# Patient Record
Sex: FEMALE | Race: WHITE | NOT HISPANIC OR LATINO | Employment: OTHER | URBAN - METROPOLITAN AREA
[De-identification: names, ages, dates, MRNs, and addresses within clinical notes are randomized per-mention and may not be internally consistent; named-entity substitution may affect disease eponyms.]

---

## 2017-01-27 ENCOUNTER — APPOINTMENT (EMERGENCY)
Dept: RADIOLOGY | Facility: HOSPITAL | Age: 82
End: 2017-01-27
Payer: COMMERCIAL

## 2017-01-27 ENCOUNTER — APPOINTMENT (EMERGENCY)
Dept: CT IMAGING | Facility: HOSPITAL | Age: 82
End: 2017-01-27
Payer: COMMERCIAL

## 2017-01-27 ENCOUNTER — HOSPITAL ENCOUNTER (EMERGENCY)
Facility: HOSPITAL | Age: 82
Discharge: HOME | End: 2017-01-27
Admitting: EMERGENCY MEDICINE
Payer: COMMERCIAL

## 2017-01-27 VITALS
RESPIRATION RATE: 20 BRPM | SYSTOLIC BLOOD PRESSURE: 166 MMHG | DIASTOLIC BLOOD PRESSURE: 71 MMHG | TEMPERATURE: 97.6 F | OXYGEN SATURATION: 97 % | WEIGHT: 141 LBS | BODY MASS INDEX: 25.95 KG/M2 | HEIGHT: 62 IN | HEART RATE: 80 BPM
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
## 2017-01-27 LAB
ALBUMIN SERPL BCP-MCNC: 3.6 G/DL (ref 3.5–5)
ALP SERPL-CCNC: 83 U/L (ref 46–116)
ALT SERPL W P-5'-P-CCNC: 19 U/L (ref 12–78)
ANION GAP SERPL CALCULATED.3IONS-SCNC: 9 MMOL/L (ref 4–13)
APTT PPP: 30 SECONDS (ref 24–36)
AST SERPL W P-5'-P-CCNC: 22 U/L (ref 5–45)
BACTERIA UR QL AUTO: ABNORMAL /HPF
BASOPHILS # BLD AUTO: 0.03 THOUSANDS/ΜL (ref 0–0.1)
BASOPHILS NFR BLD AUTO: 1 % (ref 0–1)
BILIRUB SERPL-MCNC: 0.4 MG/DL (ref 0.2–1)
BILIRUB UR QL STRIP: NEGATIVE
BUN SERPL-MCNC: 16 MG/DL (ref 5–25)
CALCIUM SERPL-MCNC: 9.4 MG/DL (ref 8.3–10.1)
CHLORIDE SERPL-SCNC: 93 MMOL/L (ref 100–108)
CLARITY UR: ABNORMAL
CO2 SERPL-SCNC: 26 MMOL/L (ref 21–32)
COLOR UR: YELLOW
CREAT SERPL-MCNC: 1.22 MG/DL (ref 0.6–1.3)
EOSINOPHIL # BLD AUTO: 0.05 THOUSAND/ΜL (ref 0–0.61)
EOSINOPHIL NFR BLD AUTO: 1 % (ref 0–6)
ERYTHROCYTE [DISTWIDTH] IN BLOOD BY AUTOMATED COUNT: 13.4 % (ref 11.6–15.1)
GFR SERPL CREATININE-BSD FRML MDRD: 41.8 ML/MIN/1.73SQ M
GLUCOSE SERPL-MCNC: 87 MG/DL (ref 65–140)
GLUCOSE UR STRIP-MCNC: NEGATIVE MG/DL
HCT VFR BLD AUTO: 37.6 % (ref 34.8–46.1)
HGB BLD-MCNC: 12.6 G/DL (ref 11.5–15.4)
HGB UR QL STRIP.AUTO: NEGATIVE
INR PPP: 0.97 (ref 0.86–1.16)
KETONES UR STRIP-MCNC: ABNORMAL MG/DL
LEUKOCYTE ESTERASE UR QL STRIP: ABNORMAL
LYMPHOCYTES # BLD AUTO: 1.77 THOUSANDS/ΜL (ref 0.6–4.47)
LYMPHOCYTES NFR BLD AUTO: 35 % (ref 14–44)
MCH RBC QN AUTO: 28.6 PG (ref 26.8–34.3)
MCHC RBC AUTO-ENTMCNC: 33.5 G/DL (ref 31.4–37.4)
MCV RBC AUTO: 85 FL (ref 82–98)
MONOCYTES # BLD AUTO: 0.4 THOUSAND/ΜL (ref 0.17–1.22)
MONOCYTES NFR BLD AUTO: 8 % (ref 4–12)
NEUTROPHILS # BLD AUTO: 2.81 THOUSANDS/ΜL (ref 1.85–7.62)
NEUTS SEG NFR BLD AUTO: 55 % (ref 43–75)
NITRITE UR QL STRIP: NEGATIVE
NON-SQ EPI CELLS URNS QL MICRO: ABNORMAL /HPF
PH UR STRIP.AUTO: 7 [PH] (ref 4.5–8)
PLATELET # BLD AUTO: 242 THOUSANDS/UL (ref 149–390)
PMV BLD AUTO: 9.2 FL (ref 8.9–12.7)
POTASSIUM SERPL-SCNC: 4.3 MMOL/L (ref 3.5–5.3)
PROT SERPL-MCNC: 6.6 G/DL (ref 6.4–8.2)
PROT UR STRIP-MCNC: NEGATIVE MG/DL
PROTHROMBIN TIME: 12.8 SECONDS (ref 12–14.3)
RBC # BLD AUTO: 4.41 MILLION/UL (ref 3.81–5.12)
RBC #/AREA URNS AUTO: ABNORMAL /HPF
SODIUM SERPL-SCNC: 128 MMOL/L (ref 136–145)
SP GR UR STRIP.AUTO: 1.01 (ref 1–1.03)
UROBILINOGEN UR QL STRIP.AUTO: 0.2 E.U./DL
WBC # BLD AUTO: 5.06 THOUSAND/UL (ref 4.31–10.16)
WBC #/AREA URNS AUTO: ABNORMAL /HPF

## 2017-01-27 PROCEDURE — 87086 URINE CULTURE/COLONY COUNT: CPT | Performed by: PHYSICIAN ASSISTANT

## 2017-01-27 PROCEDURE — 93005 ELECTROCARDIOGRAM TRACING: CPT

## 2017-01-27 PROCEDURE — 85610 PROTHROMBIN TIME: CPT | Performed by: PHYSICIAN ASSISTANT

## 2017-01-27 PROCEDURE — 99285 EMERGENCY DEPT VISIT HI MDM: CPT

## 2017-01-27 PROCEDURE — 73521 X-RAY EXAM HIPS BI 2 VIEWS: CPT

## 2017-01-27 PROCEDURE — 80053 COMPREHEN METABOLIC PANEL: CPT | Performed by: PHYSICIAN ASSISTANT

## 2017-01-27 PROCEDURE — 36415 COLL VENOUS BLD VENIPUNCTURE: CPT | Performed by: PHYSICIAN ASSISTANT

## 2017-01-27 PROCEDURE — 70450 CT HEAD/BRAIN W/O DYE: CPT

## 2017-01-27 PROCEDURE — 85025 COMPLETE CBC W/AUTO DIFF WBC: CPT | Performed by: PHYSICIAN ASSISTANT

## 2017-01-27 PROCEDURE — 81001 URINALYSIS AUTO W/SCOPE: CPT | Performed by: PHYSICIAN ASSISTANT

## 2017-01-27 PROCEDURE — 72125 CT NECK SPINE W/O DYE: CPT

## 2017-01-27 PROCEDURE — 71010 HB CHEST X-RAY 1 VIEW FRONTAL: CPT

## 2017-01-27 PROCEDURE — 85730 THROMBOPLASTIN TIME PARTIAL: CPT | Performed by: PHYSICIAN ASSISTANT

## 2017-01-27 RX ORDER — LOVASTATIN 20 MG/1
20 TABLET ORAL
Status: ON HOLD | COMMUNITY
End: 2018-04-16

## 2017-01-27 RX ORDER — HYDROCHLOROTHIAZIDE 25 MG/1
12.5 TABLET ORAL DAILY
COMMUNITY

## 2017-01-27 RX ORDER — LEVOTHYROXINE SODIUM 0.05 MG/1
50 TABLET ORAL DAILY
COMMUNITY
End: 2018-04-18 | Stop reason: HOSPADM

## 2017-01-27 RX ORDER — LISINOPRIL 20 MG/1
20 TABLET ORAL DAILY
COMMUNITY

## 2017-01-28 LAB — BACTERIA UR CULT: NORMAL

## 2017-01-30 LAB
ATRIAL RATE: 65 BPM
P AXIS: 36 DEGREES
PR INTERVAL: 194 MS
QRS AXIS: -39 DEGREES
QRSD INTERVAL: 112 MS
QT INTERVAL: 426 MS
QTC INTERVAL: 443 MS
T WAVE AXIS: 84 DEGREES
VENTRICULAR RATE: 65 BPM

## 2018-04-16 ENCOUNTER — APPOINTMENT (EMERGENCY)
Dept: RADIOLOGY | Facility: HOSPITAL | Age: 83
DRG: 065 | End: 2018-04-16
Payer: COMMERCIAL

## 2018-04-16 ENCOUNTER — HOSPITAL ENCOUNTER (INPATIENT)
Facility: HOSPITAL | Age: 83
LOS: 2 days | Discharge: HOME HEALTH | DRG: 065 | End: 2018-04-18
Attending: EMERGENCY MEDICINE | Admitting: HOSPITALIST
Payer: COMMERCIAL

## 2018-04-16 ENCOUNTER — APPOINTMENT (INPATIENT)
Dept: MRI IMAGING | Facility: HOSPITAL | Age: 83
DRG: 065 | End: 2018-04-16
Payer: COMMERCIAL

## 2018-04-16 ENCOUNTER — APPOINTMENT (EMERGENCY)
Dept: CT IMAGING | Facility: HOSPITAL | Age: 83
DRG: 065 | End: 2018-04-16
Payer: COMMERCIAL

## 2018-04-16 DIAGNOSIS — I63.9 ACUTE CVA (CEREBROVASCULAR ACCIDENT) (HCC): ICD-10-CM

## 2018-04-16 DIAGNOSIS — E03.9 ACQUIRED HYPOTHYROIDISM: ICD-10-CM

## 2018-04-16 DIAGNOSIS — I63.9 CVA (CEREBRAL VASCULAR ACCIDENT) (HCC): Primary | ICD-10-CM

## 2018-04-16 PROBLEM — E78.49 OTHER HYPERLIPIDEMIA: Status: ACTIVE | Noted: 2018-04-16

## 2018-04-16 PROBLEM — I10 ESSENTIAL HYPERTENSION: Status: ACTIVE | Noted: 2018-04-16

## 2018-04-16 LAB
ALBUMIN SERPL BCP-MCNC: 3.5 G/DL (ref 3.5–5)
ALP SERPL-CCNC: 79 U/L (ref 46–116)
ALT SERPL W P-5'-P-CCNC: 18 U/L (ref 12–78)
AMMONIA PLAS-SCNC: <10 UMOL/L (ref 11–35)
AMPHETAMINES SERPL QL SCN: NEGATIVE
ANION GAP SERPL CALCULATED.3IONS-SCNC: 10 MMOL/L (ref 4–13)
AST SERPL W P-5'-P-CCNC: 21 U/L (ref 5–45)
ATRIAL RATE: 58 BPM
BARBITURATES UR QL: NEGATIVE
BASOPHILS # BLD AUTO: 0.02 THOUSANDS/ΜL (ref 0–0.1)
BASOPHILS NFR BLD AUTO: 0 % (ref 0–1)
BENZODIAZ UR QL: NEGATIVE
BILIRUB SERPL-MCNC: 0.5 MG/DL (ref 0.2–1)
BILIRUB UR QL STRIP: NEGATIVE
BUN SERPL-MCNC: 13 MG/DL (ref 5–25)
CALCIUM SERPL-MCNC: 9.1 MG/DL
CHLORIDE SERPL-SCNC: 99 MMOL/L (ref 100–108)
CLARITY UR: CLEAR
CO2 SERPL-SCNC: 26 MMOL/L (ref 21–32)
COCAINE UR QL: NEGATIVE
COLOR UR: YELLOW
CREAT SERPL-MCNC: 0.9 MG/DL (ref 0.6–1.3)
EOSINOPHIL # BLD AUTO: 0.02 THOUSAND/ΜL (ref 0–0.61)
EOSINOPHIL NFR BLD AUTO: 0 % (ref 0–6)
ERYTHROCYTE [DISTWIDTH] IN BLOOD BY AUTOMATED COUNT: 13.8 % (ref 11.6–15.1)
ETHANOL SERPL-MCNC: <3 MG/DL (ref 0–3)
GFR SERPL CREATININE-BSD FRML MDRD: 58 ML/MIN/1.73SQ M
GLUCOSE SERPL-MCNC: 72 MG/DL (ref 65–140)
GLUCOSE UR STRIP-MCNC: NEGATIVE MG/DL
HCT VFR BLD AUTO: 37.7 % (ref 34.8–46.1)
HGB BLD-MCNC: 12.4 G/DL (ref 11.5–15.4)
HGB UR QL STRIP.AUTO: NEGATIVE
KETONES UR STRIP-MCNC: ABNORMAL MG/DL
LEUKOCYTE ESTERASE UR QL STRIP: NEGATIVE
LYMPHOCYTES # BLD AUTO: 1.28 THOUSANDS/ΜL (ref 0.6–4.47)
LYMPHOCYTES NFR BLD AUTO: 23 % (ref 14–44)
MCH RBC QN AUTO: 28.2 PG (ref 26.8–34.3)
MCHC RBC AUTO-ENTMCNC: 32.9 G/DL (ref 31.4–37.4)
MCV RBC AUTO: 86 FL (ref 82–98)
METHADONE UR QL: NEGATIVE
MONOCYTES # BLD AUTO: 0.37 THOUSAND/ΜL (ref 0.17–1.22)
MONOCYTES NFR BLD AUTO: 7 % (ref 4–12)
NEUTROPHILS # BLD AUTO: 4.01 THOUSANDS/ΜL (ref 1.85–7.62)
NEUTS SEG NFR BLD AUTO: 70 % (ref 43–75)
NITRITE UR QL STRIP: NEGATIVE
OPIATES UR QL SCN: NEGATIVE
P AXIS: 57 DEGREES
PCP UR QL: NEGATIVE
PH UR STRIP.AUTO: 7 [PH] (ref 4.5–8)
PLATELET # BLD AUTO: 250 THOUSANDS/UL (ref 149–390)
PMV BLD AUTO: 9.6 FL (ref 8.9–12.7)
POTASSIUM SERPL-SCNC: 3.6 MMOL/L (ref 3.5–5.3)
PR INTERVAL: 216 MS
PROT SERPL-MCNC: 6.7 G/DL (ref 6.4–8.2)
PROT UR STRIP-MCNC: NEGATIVE MG/DL
QRS AXIS: -29 DEGREES
QRSD INTERVAL: 112 MS
QT INTERVAL: 452 MS
QTC INTERVAL: 443 MS
RBC # BLD AUTO: 4.39 MILLION/UL (ref 3.81–5.12)
SODIUM SERPL-SCNC: 135 MMOL/L (ref 136–145)
SP GR UR STRIP.AUTO: 1.01 (ref 1–1.03)
T WAVE AXIS: 86 DEGREES
T4 FREE SERPL-MCNC: 0.9 NG/DL (ref 0.76–1.46)
THC UR QL: NEGATIVE
TROPONIN I SERPL-MCNC: <0.02 NG/ML
TSH SERPL DL<=0.05 MIU/L-ACNC: 5.7 UIU/ML (ref 0.36–3.74)
UROBILINOGEN UR QL STRIP.AUTO: 0.2 E.U./DL
VENTRICULAR RATE: 58 BPM
WBC # BLD AUTO: 5.7 THOUSAND/UL (ref 4.31–10.16)

## 2018-04-16 PROCEDURE — 93010 ELECTROCARDIOGRAM REPORT: CPT | Performed by: INTERNAL MEDICINE

## 2018-04-16 PROCEDURE — 84484 ASSAY OF TROPONIN QUANT: CPT | Performed by: EMERGENCY MEDICINE

## 2018-04-16 PROCEDURE — 70496 CT ANGIOGRAPHY HEAD: CPT

## 2018-04-16 PROCEDURE — 81003 URINALYSIS AUTO W/O SCOPE: CPT | Performed by: EMERGENCY MEDICINE

## 2018-04-16 PROCEDURE — 82140 ASSAY OF AMMONIA: CPT | Performed by: EMERGENCY MEDICINE

## 2018-04-16 PROCEDURE — 71046 X-RAY EXAM CHEST 2 VIEWS: CPT

## 2018-04-16 PROCEDURE — 99223 1ST HOSP IP/OBS HIGH 75: CPT | Performed by: HOSPITALIST

## 2018-04-16 PROCEDURE — G0480 DRUG TEST DEF 1-7 CLASSES: HCPCS | Performed by: EMERGENCY MEDICINE

## 2018-04-16 PROCEDURE — 84439 ASSAY OF FREE THYROXINE: CPT | Performed by: HOSPITALIST

## 2018-04-16 PROCEDURE — 80053 COMPREHEN METABOLIC PANEL: CPT | Performed by: EMERGENCY MEDICINE

## 2018-04-16 PROCEDURE — 80307 DRUG TEST PRSMV CHEM ANLYZR: CPT | Performed by: EMERGENCY MEDICINE

## 2018-04-16 PROCEDURE — 99285 EMERGENCY DEPT VISIT HI MDM: CPT

## 2018-04-16 PROCEDURE — 84443 ASSAY THYROID STIM HORMONE: CPT | Performed by: EMERGENCY MEDICINE

## 2018-04-16 PROCEDURE — 70450 CT HEAD/BRAIN W/O DYE: CPT

## 2018-04-16 PROCEDURE — 92610 EVALUATE SWALLOWING FUNCTION: CPT

## 2018-04-16 PROCEDURE — 93005 ELECTROCARDIOGRAM TRACING: CPT | Performed by: EMERGENCY MEDICINE

## 2018-04-16 PROCEDURE — 80320 DRUG SCREEN QUANTALCOHOLS: CPT | Performed by: EMERGENCY MEDICINE

## 2018-04-16 PROCEDURE — 70551 MRI BRAIN STEM W/O DYE: CPT

## 2018-04-16 PROCEDURE — 70498 CT ANGIOGRAPHY NECK: CPT

## 2018-04-16 PROCEDURE — 36415 COLL VENOUS BLD VENIPUNCTURE: CPT | Performed by: EMERGENCY MEDICINE

## 2018-04-16 PROCEDURE — 85025 COMPLETE CBC W/AUTO DIFF WBC: CPT | Performed by: EMERGENCY MEDICINE

## 2018-04-16 RX ORDER — LEVOTHYROXINE SODIUM 0.07 MG/1
75 TABLET ORAL DAILY
Status: DISCONTINUED | OUTPATIENT
Start: 2018-04-16 | End: 2018-04-18 | Stop reason: HOSPADM

## 2018-04-16 RX ORDER — ASPIRIN 325 MG
325 TABLET ORAL ONCE
Status: COMPLETED | OUTPATIENT
Start: 2018-04-16 | End: 2018-04-16

## 2018-04-16 RX ORDER — ATORVASTATIN CALCIUM 40 MG/1
40 TABLET, FILM COATED ORAL EVERY EVENING
Status: DISCONTINUED | OUTPATIENT
Start: 2018-04-16 | End: 2018-04-18 | Stop reason: HOSPADM

## 2018-04-16 RX ORDER — ASPIRIN 81 MG/1
81 TABLET, CHEWABLE ORAL DAILY
Status: DISCONTINUED | OUTPATIENT
Start: 2018-04-17 | End: 2018-04-18 | Stop reason: HOSPADM

## 2018-04-16 RX ORDER — HYDROCHLOROTHIAZIDE 25 MG/1
12.5 TABLET ORAL DAILY
Status: DISCONTINUED | OUTPATIENT
Start: 2018-04-16 | End: 2018-04-18 | Stop reason: HOSPADM

## 2018-04-16 RX ORDER — ACETAMINOPHEN 325 MG/1
650 TABLET ORAL EVERY 6 HOURS PRN
Status: DISCONTINUED | OUTPATIENT
Start: 2018-04-16 | End: 2018-04-18 | Stop reason: HOSPADM

## 2018-04-16 RX ORDER — LISINOPRIL 20 MG/1
20 TABLET ORAL DAILY
Status: DISCONTINUED | OUTPATIENT
Start: 2018-04-16 | End: 2018-04-18 | Stop reason: HOSPADM

## 2018-04-16 RX ADMIN — ASPIRIN 325 MG ORAL TABLET 325 MG: 325 PILL ORAL at 13:32

## 2018-04-16 RX ADMIN — ACETAMINOPHEN 650 MG: 325 TABLET, FILM COATED ORAL at 23:40

## 2018-04-16 RX ADMIN — ATORVASTATIN CALCIUM 40 MG: 40 TABLET, FILM COATED ORAL at 18:33

## 2018-04-16 RX ADMIN — IOHEXOL 100 ML: 350 INJECTION, SOLUTION INTRAVENOUS at 12:44

## 2018-04-16 RX ADMIN — ENOXAPARIN SODIUM 40 MG: 40 INJECTION SUBCUTANEOUS at 15:36

## 2018-04-16 NOTE — ED PROVIDER NOTES
History  Chief Complaint   Patient presents with    Altered Mental Status     Pt coming from home  Daughter reports change in mental status/confusion and possible left side facial droop that seems to have resolved  Patient is an 70-year-old female  She has a history of hypertension and hypercholesterolemia  This morning she woke up confused  She was slower than normal  She was repetitive  She was having short-term memory problems  She kept asking if she had the take granddaughter to school  Her granddaughter was already at the school  Family felt that there might have been some left facial droop  That resolved  There is no trauma  There been no fevers  Otherwise she has been well  Last known well time was last night  Prior to Admission Medications   Prescriptions Last Dose Informant Patient Reported? Taking?   hydrochlorothiazide (HYDRODIURIL) 25 mg tablet   Yes Yes   Sig: Take 12 5 mg by mouth daily     levothyroxine 50 mcg tablet   Yes Yes   Sig: Take 50 mcg by mouth daily   lisinopril (ZESTRIL) 20 mg tablet   Yes Yes   Sig: Take 20 mg by mouth daily        Facility-Administered Medications: None       Past Medical History:   Diagnosis Date    Disease of thyroid gland     Hyperlipidemia     Hypertension        Past Surgical History:   Procedure Laterality Date    EYE SURGERY      b/l cataracts    ROTATOR CUFF REPAIR         History reviewed  No pertinent family history  I have reviewed and agree with the history as documented  Social History   Substance Use Topics    Smoking status: Never Smoker    Smokeless tobacco: Never Used    Alcohol use No        Review of Systems   Constitutional: Negative for chills and fever  HENT: Negative for rhinorrhea and sore throat  Eyes: Negative for pain, redness and visual disturbance  Respiratory: Negative for cough and shortness of breath  Cardiovascular: Negative for chest pain and leg swelling     Gastrointestinal: Negative for abdominal pain, diarrhea and vomiting  Endocrine: Negative for polydipsia and polyuria  Genitourinary: Negative for dysuria, frequency, hematuria, vaginal bleeding and vaginal discharge  Musculoskeletal: Negative for back pain and neck pain  Skin: Negative for rash and wound  Allergic/Immunologic: Negative for immunocompromised state  Neurological: Negative for weakness, numbness and headaches  Hematological: Does not bruise/bleed easily  Psychiatric/Behavioral: Positive for confusion  Negative for hallucinations and suicidal ideas  All other systems reviewed and are negative  Physical Exam  ED Triage Vitals [04/16/18 1117]   Temperature Pulse Respirations Blood Pressure SpO2   (!) 97 2 °F (36 2 °C) 62 18 (!) 174/78 100 %      Temp Source Heart Rate Source Patient Position - Orthostatic VS BP Location FiO2 (%)   Tympanic Monitor Lying Left arm --      Pain Score       No Pain           Orthostatic Vital Signs  Vitals:    04/16/18 1245 04/16/18 1300 04/16/18 1315 04/16/18 1431   BP: 164/70 (!) 173/75 153/75 (!) 172/77   Pulse: 66 63 61 62   Patient Position - Orthostatic VS:    Lying       Physical Exam   Constitutional: She is oriented to person, place, and time  She appears well-developed and well-nourished  No distress  HENT:   Head: Normocephalic and atraumatic  Mouth/Throat: Oropharynx is clear and moist    Eyes: Conjunctivae and EOM are normal  Pupils are equal, round, and reactive to light  Right eye exhibits no discharge  Left eye exhibits no discharge  No scleral icterus  Neck: Normal range of motion  Neck supple  Cardiovascular: Normal rate, regular rhythm, normal heart sounds and intact distal pulses  Exam reveals no gallop and no friction rub  No murmur heard  Pulmonary/Chest: Effort normal and breath sounds normal  No stridor  No respiratory distress  She has no wheezes  She has no rales  Abdominal: Soft  Bowel sounds are normal  She exhibits no distension   There is no tenderness  There is no rebound and no guarding  Musculoskeletal: Normal range of motion  She exhibits no edema, tenderness or deformity  No CVA tenderness  No calf tenderness/palpable cords  Neurological: She is alert and oriented to person, place, and time  She has normal strength  No sensory deficit  GCS eye subscore is 4  GCS verbal subscore is 5  GCS motor subscore is 6  Patient is slightly confused and repetitive  Skin: Skin is warm and dry  No rash noted  She is not diaphoretic  Psychiatric: She has a normal mood and affect  Vitals reviewed  ED Medications  Medications   hydrochlorothiazide (HYDRODIURIL) tablet 12 5 mg (not administered)   lisinopril (ZESTRIL) tablet 20 mg (not administered)   levothyroxine tablet 75 mcg (not administered)   atorvastatin (LIPITOR) tablet 40 mg (not administered)   aspirin chewable tablet 81 mg (not administered)   enoxaparin (LOVENOX) subcutaneous injection 40 mg (not administered)   iohexol (OMNIPAQUE) 350 MG/ML injection (MULTI-DOSE) 100 mL (100 mL Intravenous Given 4/16/18 1244)   aspirin tablet 325 mg (325 mg Oral Given 4/16/18 1332)       Diagnostic Studies  Results Reviewed     Procedure Component Value Units Date/Time    Rapid drug screen, urine [[id number]]  (Normal) Collected:  04/16/18 1343    Lab Status:  Final result Specimen:  Urine from Urine, Clean Catch Updated:  04/16/18 1435     Amph/Meth UR Negative     Barbiturate Ur Negative     Benzodiazepine Urine Negative     Cocaine Urine Negative     Methadone Urine Negative     Opiate Urine Negative     PCP Ur Negative     THC Urine Negative    Narrative:         FOR MEDICAL PURPOSES ONLY  IF CONFIRMATION NEEDED PLEASE CONTACT THE LAB WITHIN 5 DAYS      Drug Screen Cutoff Levels:  AMPHETAMINE/METHAMPHETAMINES  1000 ng/mL  BARBITURATES     200 ng/mL  BENZODIAZEPINES     200 ng/mL  COCAINE      300 ng/mL  METHADONE      300 ng/mL  OPIATES      300 ng/mL  PHENCYCLIDINE     25 ng/mL  THC       50 ng/mL    UA w Reflex to Microscopic [[id number]]  (Abnormal) Collected:  04/16/18 1343    Lab Status:  Final result Specimen:  Urine from Urine, Clean Catch Updated:  04/16/18 1428     Color, UA Yellow     Clarity, UA Clear     Specific Gravity, UA 1 010     pH, UA 7 0     Leukocytes, UA Negative     Nitrite, UA Negative     Protein, UA Negative mg/dl      Glucose, UA Negative mg/dl      Ketones, UA Trace (A) mg/dl      Urobilinogen, UA 0 2 E U /dl      Bilirubin, UA Negative     Blood, UA Negative    Ammonia [[id number]]  (Abnormal) Collected:  04/16/18 1137    Lab Status:  Final result Specimen:  Blood Updated:  04/16/18 1255     Ammonia <10 (L) umol/L     TSH [[id number]]  (Abnormal) Collected:  04/16/18 1145    Lab Status:  Final result Specimen:  Blood from Arm, Left Updated:  04/16/18 1239     TSH 3RD GENERATON 5 699 (H) uIU/mL     Narrative:         Patients undergoing fluorescein dye angiography may retain small amounts of fluorescein in the body for 48-72 hours post procedure  Samples containing fluorescein can produce falsely depressed TSH values  If the patient had this procedure,a specimen should be resubmitted post fluorescein clearance            The recommended reference ranges for TSH during pregnancy are as follows:  First trimester 0 1 to 2 5 uIU/mL  Second trimester  0 2 to 3 0 uIU/mL  Third trimester 0 3 to 3 0 uIU/m      Comprehensive metabolic panel [[id number]]  (Abnormal) Collected:  04/16/18 1145    Lab Status:  Final result Specimen:  Blood from Arm, Left Updated:  04/16/18 1221     Sodium 135 (L) mmol/L      Potassium 3 6 mmol/L      Chloride 99 (L) mmol/L      CO2 26 mmol/L      Anion Gap 10 mmol/L      BUN 13 mg/dL      Creatinine 0 90 mg/dL      Glucose 72 mg/dL      Calcium 9 1 mg/dL      AST 21 U/L      ALT 18 U/L      Alkaline Phosphatase 79 U/L      Total Protein 6 7 g/dL      Albumin 3 5 g/dL      Total Bilirubin 0 50 mg/dL      eGFR 58 ml/min/1 73sq m     Narrative:         National Kidney Disease Education Program recommendations are as follows:  GFR calculation is accurate only with a steady state creatinine  Chronic Kidney disease less than 60 ml/min/1 73 sq  meters  Kidney failure less than 15 ml/min/1 73 sq  meters  Ethanol [[id number]]  (Normal) Collected:  04/16/18 1145    Lab Status:  Final result Specimen:  Blood from Arm, Left Updated:  04/16/18 1218     Ethanol Lvl <3 0 mg/dL     Troponin I [[id number]]  (Normal) Collected:  04/16/18 1145    Lab Status:  Final result Specimen:  Blood from Arm, Left Updated:  04/16/18 1213     Troponin I <0 02 ng/mL     Narrative:         [employer] Chemistry analyzer 99% cutoff is > 0 04 ng/mL in network labs    o cTnI 99% cutoff is useful only when applied to patients in the clinical setting of myocardial ischemia  o cTnI 99% cutoff should be interpreted in the context of clinical history, ECG findings and possibly cardiac imaging to establish correct diagnosis  o cTnI 99% cutoff may be suggestive but clearly not indicative of a coronary event without the clinical setting of myocardial ischemia      CBC and differential [[id number]]  (Normal) Collected:  04/16/18 1145    Lab Status:  Final result Specimen:  Blood from Arm, Left Updated:  04/16/18 1155     WBC 5 70 Thousand/uL      RBC 4 39 Million/uL      Hemoglobin 12 4 g/dL      Hematocrit 37 7 %      MCV 86 fL      MCH 28 2 pg      MCHC 32 9 g/dL      RDW 13 8 %      MPV 9 6 fL      Platelets 307 Thousands/uL      Neutrophils Relative 70 %      Lymphocytes Relative 23 %      Monocytes Relative 7 %      Eosinophils Relative 0 %      Basophils Relative 0 %      Neutrophils Absolute 4 01 Thousands/µL      Lymphocytes Absolute 1 28 Thousands/µL      Monocytes Absolute 0 37 Thousand/µL      Eosinophils Absolute 0 02 Thousand/µL      Basophils Absolute 0 02 Thousands/µL                  CTA head and neck with and without contrast   Final Result by Isatu Viveros MD (04/16 1936)   Calcified thrombus at the level of the origin of the right superior frontal branch of the right middle cerebral artery, associated with several cm anterior right frontal acute infarction, similar to recent CT  No evidence of critical stenosis involving the cervical carotid or vertebral segments, although 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, 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      Approximately 2 6 cm right neck mass likely representing adenopathy, uncertain source      Moderate chronic microangiopathic ischemic changes involving supratentorial white matter         Findings personally discussed by phone with Dr Meg Green at 1:15 PM, 4/16/2018               Workstation performed: [id number]         XR chest 2 views   ED Interpretation by Rafael Irwin MD (04/16 1302)   Cardiomegaly  No infiltrates  No congestive heart failure  Final Result by Rina Fuchs MD (04/16 1157)      No active pulmonary disease  Workstation performed: [id number]         CT head without contrast   Final Result by Rosa Elena Argueta MD (04/16 1207)   Addendum 1 of 1 by Rosa Elena Argueta MD (04/16 1212)   ADDENDUM:      8mm right sublenticular prominent perivascular space  Final      Acute right anterolateral frontal lobe infarction  No intraparenchymal hemorrhage  Potential punctate hyperdense thrombus right prefrontal branch of the superior M2 segment (image 20 series 2)  Chronic microangiopathy  I personally discussed this study with Lavelle Rivera on 4/16/2018 at 12:04 PM                            Workstation performed: [id number]         MRI Inpatient Order    (Results Pending)              Procedures  ECG 12 Lead Documentation  Date/Time: 4/16/2018 1:03 PM  Performed by: Fritz Pereyra by: Diogo Santana     ECG reviewed by me, the ED Provider: yes    Patient location:  ED  Comments:       Normal sinus rhythm  Nonspecific lateral ST abnormality  No ectopy             Phone Contacts  ED Phone Contact    ED Course  ED Course              NIH Stroke Scale    Flowsheet Row Most Recent Value   Level of Consciousness (1a )  0 Filed at: 04/16/2018 1206   LOC Questions (1b )  0 Filed at: 04/16/2018 1206   LOC Commands (1c )  0 Filed at: 04/16/2018 1206   Best Gaze (2 )  0 Filed at: 04/16/2018 1206   Visual (3 )  0 Filed at: 04/16/2018 1206   Facial Palsy (4 )  0 Filed at: 04/16/2018 1206   Motor Arm, Left (5a )  0 Filed at: 04/16/2018 1206   Motor Arm, Right (5b )  0 Filed at: 04/16/2018 1206   Motor Leg, Left (6a )  0 Filed at: 04/16/2018 1206   Motor Leg, Right (6b )  0 Filed at: 04/16/2018 1206   Limb Ataxia (7 )  0 Filed at: 04/16/2018 1206   Sensory (8 )  0 Filed at: 04/16/2018 1206   Best Language (9 )  0 Filed at: 04/16/2018 1206   Dysarthria (10 )  0 Filed at: 04/16/2018 1206   Extinction and Inattention (11 ) (Formerly Neglect)  0 Filed at: 04/16/2018 1206   Total  0 Filed at: 04/16/2018 1206                        MDM  Number of Diagnoses or Management Options  Diagnosis management comments:  Stroke alert was called  Patient did undergo CT of the head  It did show a relatively acute infarct of the right frontal lobe  She is not a tPA candidate because she is out of the tPA window  NIH stroke scale is actually 0  Consulted with Neurology  Patient is not a candidate for mechanical clot removal   Consulted with Medicine for admission  Amount and/or Complexity of Data Reviewed  Clinical lab tests: ordered and reviewed  Tests in the radiology section of CPT®: ordered and reviewed  Discuss the patient with other providers: yes  Independent visualization of images, tracings, or specimens: yes      The patient presented with a condition in which there was a high probability of imminent or life-threatening deterioration, and critical care services (excluding separately billable procedures) totalled 30-74 minutes (60 min excluding procedure time  )          Disposition  Final diagnoses:   Acute CVA (cerebrovascular accident) [facility])     Time reflects when diagnosis was documented in both MDM as applicable and the Disposition within this note     Time User Action Codes Description Comment    4/16/2018 12:42 PM Chetna Evans Add [I63 9] CVA (cerebral vascular accident) ([facility] [address] )     4/16/2018  1:01 PM Ana María Ugalde Add [I63 9] Acute CVA (cerebrovascular accident) [facility])       ED Disposition     ED Disposition Condition Comment    Admit        Follow-up Information    None       Current Discharge Medication List      CONTINUE these medications which have NOT CHANGED    Details   hydrochlorothiazide (HYDRODIURIL) 25 mg tablet Take 12 5 mg by mouth daily        levothyroxine 50 mcg tablet Take 50 mcg by mouth daily      lisinopril (ZESTRIL) 20 mg tablet Take 20 mg by mouth daily             No discharge procedures on file      ED Provider  Electronically Signed by           Blanquita Marie MD  04/16/18 [address], [place]  04/16/18 7939

## 2018-04-16 NOTE — ASSESSMENT & PLAN NOTE
-CT Brain: Acute right anterolateral frontal lobe infarction   No intraparenchymal hemorrhage   -aspirin and statin  -MRI of the brain  -echocardiogram  -consult Neurology  -monitor on telemetry  -PT/OT

## 2018-04-16 NOTE — SPEECH THERAPY NOTE
Speech Language/Pathology  Speech/Language Pathology  Assessment    Patient Name: Bharati Kenny  SHPGQ'A Date: 4/16/2018     Problem List  Patient Active Problem List   Diagnosis    [facility]) [facility])    Essential hypertension    Other hyperlipidemia    Acquired hypothyroidism     Past Medical History  Past Medical History:   Diagnosis Date    Disease of thyroid gland     Hyperlipidemia     Hypertension      Past Surgical History  Past Surgical History:   Procedure Laterality Date    EYE SURGERY      b/l cataracts    ROTATOR CUFF REPAIR     Summary:  Pt presents w/ a relatively functional swallow but w/ noted large bites & periodic dry cough  Pt does take large boluses & over fill the oral cavity but appears to tolerate it  Will f/u as able  Recommendations:  Diet: continue regular  Liquid: thin  Meds: as tolerated  Supervision:  Positioning:Upright  Strategies: Pt to take PO/Meds only when fully alert and upright  Oral care: frequently  Aspiration precautions    Therapy Prognosis: will f/u 1-2x as able, will call nsg   Prognosis considerations: age/cognition  Frequency:1-2x      80 y o  female who presents with a chief complaint confusion  The patient was in her usual state of health yesterday  The patient usually takes her granddaughter to school but today she was not scheduled to take her to school  She called her family and said that she had for gotten to take the granddaughter to school and was confused  She kept repeating the same thing over and over again  She may have had a left-sided facial droop which has resolved  She denies any other symptomatology  Her symptoms were noted at 10:00 and the last time she was seen in her usual state health was last night  The patient has no other complaints  She does remember the events from earlier and does know at this time that she was not supposed to take her granddaughter to school   Patient denies chest pain, shortness of breath, palpitations, cough, nausea, vomiting, diarrhea, abdominal pain, fevers, chills, night sweats, headache, visual disturbances, neck stiffness, rash, dysuria, heat or cold intolerance, significant weight gain or loss  MRI-Relatively large early subacute infarct within the right lateral frontal lobe corresponding to the abnormality seen on CT scan earlier today  Mild localized mass effect with no hemorrhagic transformation    Atrophy and chronic microangiopathic change scattered within the remainder of the brain parenchyma      CXR-No active pulmonary disease    Reason for consult:  R/o aspiration  Determine safest and least restrictive diet  Failed nursing dysphagia assessment  Change in mental status  New neuro event    Current diet:  Regular w/ thin  Premorbid diet[de-identified]  Regular w/ thin  Previous VBS:  -  O2 requirement:  RA  Voice/Speech:  Clear, intelligible  Social:  Home w/ her grd ddtr  Follows commands:      yes                    Cognitive Status:  Alert, cooperative, oriented to person, general time, reason for adm  Oral mech exam:  Upper/lower dentures  Full symmetry  Items administered:  Puree, soft solid, hard solid, thin liquids, meds whole/crushed in applesauce/water  Liquids were taken by straw  Oral stage:  Lip closure: wfl  Mastication: adequate  Bolus formation:wfl  Bolus control: wfl  Transfer: timely  Oral residue:none noted  Pt takes large boluses, c/o being hungry    Pharyngeal stage:  Swallow promptness: +  Laryngeal rise: wfl  Wet voice: none  Throat clear: none  Cough: intermittent through meal but well after swallows, dry??  Secondary swallows:mult swallows   Audible swallows:  No s/s aspiration:    Esophageal stage:  No s/s reported      Goal(s):  Pt will tolerate least restrictive diet w/out s/s aspiration or oral/pharyngeal difficulties

## 2018-04-16 NOTE — H&P
H&P- Al Man 12/16/1930, 80 y o  female MRN: [id number]    Unit/Bed#: [address] [place], [place] [id number] Encounter: [id number]    Primary Care Provider: Rivka Jennings   Date and time admitted to hospital: 4/16/2018 11:04 AM        Acquired hypothyroidism   Assessment & Plan    -TSH is elevated so will increase the patient's Levoxyl 75 mcg daily  -repeat thyroid function tests in 4-6 weeks  -check free T4 now        Other hyperlipidemia   Assessment & Plan    -change statin to Lipitor 40 mg daily        Essential hypertension   Assessment & Plan    -continue HCTZ and lisinopril        * Acute CVA (cerebrovascular accident) ([facility] [address] )   Assessment & Plan    -CT Brain: Acute right anterolateral frontal lobe infarction   No intraparenchymal hemorrhage   -aspirin and statin  -MRI of the brain  -echocardiogram  -consult Neurology  -monitor on telemetry  -PT/OT          VTE Prophylaxis: Enoxaparin (Lovenox)  / sequential compression device   Code Status: FULL  POLST: POLST is not applicable to this patient  Discussion with family: multiple family members at bedside    Anticipated Length of Stay:  Patient will be admitted on an Inpatient basis with an anticipated length of stay of  > 2 midnights  Justification for Hospital Stay: CVA    Total Time for Visit, including Counseling / Coordination of Care: 30 minutes  Greater than 50% of this total time spent on direct patient counseling and coordination of care  Chief Complaint:   confusion    History of Present Illness:    Jose Chapman is a 80 y o  female who presents with a chief complaint confusion  The patient was in her usual state of health yesterday  The patient usually takes her granddaughter to school but today she was not scheduled to take her to school  She called her family and said that she had for gotten to take the granddaughter to school and was confused  She kept repeating the same thing over and over again    She may have had a left-sided facial droop which has resolved  She denies any other symptomatology  Her symptoms were noted at 10:00 and the last time she was seen in her usual state health was last night  The patient has no other complaints  She does remember the events from earlier and does know at this time that she was not supposed to take her granddaughter to school  Patient denies chest pain, shortness of breath, palpitations, cough, nausea, vomiting, diarrhea, abdominal pain, fevers, chills, night sweats, headache, visual disturbances, neck stiffness, rash, dysuria, heat or cold intolerance, significant weight gain or loss  Review of Systems:    Review of Systems   All other systems reviewed and are negative  Past Medical and Surgical History:     Past Medical History:   Diagnosis Date    Disease of thyroid gland     Hyperlipidemia     Hypertension        Past Surgical History:   Procedure Laterality Date    EYE SURGERY      b/l cataracts    ROTATOR CUFF REPAIR         Meds/Allergies:    Prior to Admission medications    Medication Sig Start Date End Date Taking? Authorizing Provider   hydrochlorothiazide (HYDRODIURIL) 25 mg tablet Take 12 5 mg by mouth daily     Yes Historical Provider, MD   levothyroxine 50 mcg tablet Take 50 mcg by mouth daily   Yes Historical Provider, MD   lisinopril (ZESTRIL) 20 mg tablet Take 20 mg by mouth daily     Yes Historical Provider, MD   lovastatin (MEVACOR) 20 mg tablet Take 20 mg by mouth daily at bedtime  4/16/18 Yes Historical Provider, MD     I have reviewed home medications with patient personally      Allergies: No Known Allergies    Social History:     Marital Status: Single     Substance Use History:   History   Alcohol Use No     History   Smoking Status    Never Smoker   Smokeless Tobacco    Never Used     History   Drug Use No       Family History:    non-contributory    Physical Exam:     Vitals:   Blood Pressure: (!) 172/77 (04/16/18 1431)  Pulse: 62 (04/16/18 1431)  Temperature: 98 7 °F (37 1 °C) (04/16/18 1431)  Temp Source: Oral (04/16/18 1431)  Respirations: 17 (04/16/18 1431)  Height: 5' 1 2" (155 4 cm) (04/16/18 1431)  Weight - Scale: 67 8 kg (149 lb 7 6 oz) (04/16/18 1431)  SpO2: 98 % (04/16/18 1431)    Physical Exam    Gen: NAD, AAOx3, well developed, well nourished  Eyes: EOMI, PERRLA, no scleral icterus  ENMT:  Oropharynx clear of erythema or exudates, no nasal discharge, no otic discharge, moist mucous membranes  Neck:  Supple  Lymph:  No anterior or posterior cervical or supraclavicular lymphadenopathy  Cardiovascular:  Regular rate and rhythm, normal S1-S2, no murmurs, rubs, or gallops  Lungs:  Clear to auscultation bilaterally, no wheezes, or rales, or rhonchi  Abdomen:  Positive bowel sounds, soft, nontender, nondistended, no palpable organomegaly   Skin:  Intact, no obvious lesions or rashes, no edema  Neuro: Cranial nerves 2-12 are intact, non-focal, 5/5 strength in all 4 extremities      Additional Data:     Lab Results: I have personally reviewed pertinent reports  Results from last 7 days  Lab Units 04/16/18  1145   WBC Thousand/uL 5 70   HEMOGLOBIN g/dL 12 4   HEMATOCRIT % 37 7   PLATELETS Thousands/uL 250   NEUTROS PCT % 70   LYMPHS PCT % 23   MONOS PCT % 7   EOS PCT % 0       Results from last 7 days  Lab Units 04/16/18  1145   SODIUM mmol/L 135*   POTASSIUM mmol/L 3 6   CHLORIDE mmol/L 99*   CO2 mmol/L 26   BUN mg/dL 13   CREATININE mg/dL 0 90   CALCIUM mg/dL 9 1   TOTAL PROTEIN g/dL 6 7   BILIRUBIN TOTAL mg/dL 0 50   ALK PHOS U/L 79   ALT U/L 18   AST U/L 21   GLUCOSE RANDOM mg/dL 72           Imaging: I have personally reviewed pertinent reports  CTA head and neck with and without contrast   Final Result by Charanjit Simons MD (04/16 1336)   Calcified thrombus at the level of the origin of the right superior frontal branch of the right middle cerebral artery, associated with several cm anterior right frontal acute infarction, similar to recent CT        No evidence of critical stenosis involving the cervical carotid or vertebral segments, although 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, 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      Approximately 2 6 cm right neck mass likely representing adenopathy, uncertain source      Moderate chronic microangiopathic ischemic changes involving supratentorial white matter         Findings personally discussed by phone with Dr Jaskaran Alfonso at 1:15 PM, 4/16/2018               Workstation performed: [id number]         XR chest 2 views   ED Interpretation by Meghan Stewart MD (04/16 1302)   Cardiomegaly  No infiltrates  No congestive heart failure  Final Result by Phyllis Adams MD (04/16 1157)      No active pulmonary disease  Workstation performed: [id number]         CT head without contrast   Final Result by Vero Castillo MD (04/16 1207)   Addendum 1 of 1 by Vero Castillo MD (04/16 1212)   ADDENDUM:      8mm right sublenticular prominent perivascular space  Final      Acute right anterolateral frontal lobe infarction  No intraparenchymal hemorrhage  Potential punctate hyperdense thrombus right prefrontal branch of the superior M2 segment (image 20 series 2)  Chronic microangiopathy  I personally discussed this study with Joseph Lincoln on 4/16/2018 at 12:04 PM                            Workstation performed: [id number]         MRI Inpatient Order    (Results Pending)       EKG, Pathology, and Other Studies Reviewed on Admission:   · EKG: SB @ 58    Allscripts / Epic Records Reviewed: Yes     ** Please Note: This note has been constructed using a voice recognition system   **

## 2018-04-16 NOTE — ASSESSMENT & PLAN NOTE
-TSH is elevated so will increase the patient's Levoxyl 75 mcg daily  -repeat thyroid function tests in 4-6 weeks  -check free T4 now

## 2018-04-16 NOTE — PLAN OF CARE
Problem: CARDIOVASCULAR - ADULT  Goal: Maintains optimal cardiac output and hemodynamic stability  INTERVENTIONS:  - Monitor I/O, vital signs and rhythm  - Monitor for S/S and trends of decreased cardiac output i e  bleeding, hypotension  - Administer and titrate ordered vasoactive medications to optimize hemodynamic stability  - Assess quality of pulses, skin color and temperature  - Assess for signs of decreased coronary artery perfusion - ex  Angina  - Instruct patient to report change in severity of symptoms   Outcome: Progressing      Problem: Neurological Deficit  Goal: Neurological status is stable or improving  Interventions:  - Monitor and assess patient's level of consciousness, motor function, sensory function, and level of assistance needed for ADLs  - Monitor and report changes from baseline  Collaborate with interdisciplinary team to initiate plan and implement interventions as ordered  - Provide and maintain a safe environment  - Utilize seizure precautions  - Utilize fall precautions  - Utilize aspiration precautions  - Utilize bleeding precautions  Outcome: Progressing      Problem: Potential for Aspiration  Goal: Ventilated patient's risk of aspiration is minimized  Assess and monitor vital signs, respiratory status, airway cuff pressure, and labs (WBC)  Monitor for signs of aspiration (tachypnea, cough, rales, wheezing, cyanosis, fever)  - Elevate head of bed 30 degrees if patient has tube feeding   - Monitor tube feeding  Outcome: Completed Date Met: 04/16/18      Problem: Nutrition  Goal: Nutrition/Hydration status is improving  Monitor and assess patient's nutrition/hydration status for malnutrition (ex- brittle hair, bruises, dry skin, pale skin and conjunctiva, muscle wasting, smooth red tongue, and disorientation)  Collaborate with interdisciplinary team and initiate plan and interventions as ordered  Monitor patient's weight and dietary intake as ordered or per policy  Utilize nutrition screening tool and intervene per policy  Determine patient's food preferences and provide high-protein, high-caloric foods as appropriate  - Assist patient with eating   - Allow adequate time for meals   - Encourage patient to take dietary supplement as ordered  - Collaborate with clinical nutritionist   - Include patient/family/caregiver in decisions related to nutrition    Outcome: Progressing

## 2018-04-17 ENCOUNTER — APPOINTMENT (INPATIENT)
Dept: NON INVASIVE DIAGNOSTICS | Facility: HOSPITAL | Age: 83
DRG: 065 | End: 2018-04-17
Payer: COMMERCIAL

## 2018-04-17 LAB
CHOLEST SERPL-MCNC: 158 MG/DL (ref 50–200)
EST. AVERAGE GLUCOSE BLD GHB EST-MCNC: 111 MG/DL
HBA1C MFR BLD: 5.5 % (ref 4.2–6.3)
HDLC SERPL-MCNC: 88 MG/DL (ref 40–60)
LDLC SERPL CALC-MCNC: 60 MG/DL (ref 0–100)
TRIGL SERPL-MCNC: 50 MG/DL

## 2018-04-17 PROCEDURE — G8979 MOBILITY GOAL STATUS: HCPCS

## 2018-04-17 PROCEDURE — 99223 1ST HOSP IP/OBS HIGH 75: CPT | Performed by: PSYCHIATRY & NEUROLOGY

## 2018-04-17 PROCEDURE — 93306 TTE W/DOPPLER COMPLETE: CPT | Performed by: INTERNAL MEDICINE

## 2018-04-17 PROCEDURE — G8978 MOBILITY CURRENT STATUS: HCPCS

## 2018-04-17 PROCEDURE — 97166 OT EVAL MOD COMPLEX 45 MIN: CPT

## 2018-04-17 PROCEDURE — 80061 LIPID PANEL: CPT | Performed by: HOSPITALIST

## 2018-04-17 PROCEDURE — G8987 SELF CARE CURRENT STATUS: HCPCS

## 2018-04-17 PROCEDURE — 93306 TTE W/DOPPLER COMPLETE: CPT

## 2018-04-17 PROCEDURE — 83036 HEMOGLOBIN GLYCOSYLATED A1C: CPT | Performed by: HOSPITALIST

## 2018-04-17 PROCEDURE — 97162 PT EVAL MOD COMPLEX 30 MIN: CPT

## 2018-04-17 PROCEDURE — G8988 SELF CARE GOAL STATUS: HCPCS

## 2018-04-17 PROCEDURE — 99232 SBSQ HOSP IP/OBS MODERATE 35: CPT | Performed by: HOSPITALIST

## 2018-04-17 RX ORDER — CLOPIDOGREL BISULFATE 75 MG/1
75 TABLET ORAL DAILY
Status: DISCONTINUED | OUTPATIENT
Start: 2018-04-17 | End: 2018-04-18 | Stop reason: HOSPADM

## 2018-04-17 RX ADMIN — LISINOPRIL 20 MG: 20 TABLET ORAL at 09:28

## 2018-04-17 RX ADMIN — ASPIRIN 81 MG 81 MG: 81 TABLET ORAL at 09:29

## 2018-04-17 RX ADMIN — CLOPIDOGREL 75 MG: 75 TABLET, FILM COATED ORAL at 13:09

## 2018-04-17 RX ADMIN — LEVOTHYROXINE SODIUM 75 MCG: 75 TABLET ORAL at 06:45

## 2018-04-17 RX ADMIN — ENOXAPARIN SODIUM 40 MG: 40 INJECTION SUBCUTANEOUS at 09:29

## 2018-04-17 RX ADMIN — ACETAMINOPHEN 650 MG: 325 TABLET, FILM COATED ORAL at 09:33

## 2018-04-17 RX ADMIN — ATORVASTATIN CALCIUM 40 MG: 40 TABLET, FILM COATED ORAL at 19:31

## 2018-04-17 RX ADMIN — HYDROCHLOROTHIAZIDE 12.5 MG: 25 TABLET ORAL at 09:28

## 2018-04-17 NOTE — CONSULTS
Consultation - Neurology   Bernadine Rosen 80 y o  female MRN: [id number]  Unit/Bed#: [address] [place] [address] [id number] Encounter: [id number]      Assessment/Plan   Assessment:  Bernadine Rosen is a 80 y o  female with a past medical history of HTN, HLD and Hypothyroidism who awoke with a change in mental status with daughter noting confusion and possible left sided facial droop which resolved  Imaging confirmed a right lateral frontal lobe infarct, as well as a calcified thrombus at M2  Echo read pending  Plan:  Right lateral frontal lobe infarct   Suspect that the etiology of this is secondary to the M2 occlusive thrombus which was likely chronically partially occluded and subsequently became completely occluded  Cannot exclude embolic etiology, though less likely  Will await ECHO read  Due to intracranial stenosis patient will need to be started on dual antiplatelet x 3 months  Will add Plavix  Loaded with Aspirin 325mg, Continue Aspirin 81mg   Add Plavix 75mg  Continue Atorvastatin 40mg   Acute ischemic stroke protocol  CT head/MRI confirm above infarct  CTA of head and neck reveals a calcified M2 thrombus  MRI brain without contrast completed  Echo pending - await read to evaluate for atrial enlargement, valvular disease or thrombosis  Telemetry  Lipid panel: total: 158, Triglycerides 50, HDL 88, LDL 60  HbA1C pending  TSH 5 699, free T4 0 9  Ammonia unremarkable  UA negative  UDS negative  Ethanol negative  Secondary risk factor modification  SBP goal <180  Hyperglycemia control   PT/OT/ST  Frequent neurological checks  Stat CT head with acute decline of in exam/mental status  Notify neurology with any changes in examination  Additional recommendations as per Attending neurologist  Outpatient follow-up with neurology  Communication sent to office       HTN  HCTZ  Lisinopril    Hypothyroidism  Levothyroxine  TSH elevated - so dose was increased by primary team    Hyperlipidemia  Statin    2 6cm right neck mass Adenopathy noted on imaging  As per primary team    History of Present Illness     Reason for Consult / Principal Problem: CVA    HPI: Brooks Montilla is a 80 y  o female with a past medical history of HTN, HLD and Hypothyroidism who awoke with a change in mental status with daughter noting confusion and possible left sided facial droop which resolved  Notes state that the patient appeared slower than normal, repeating things and continued to ask if she had taken her grand-daughter to school  Last known well was the night prior  NIHSS 0 in ED  No TPA due to unknown time of onset and NIHSS of 0  Patient was hypertensive at 174/78 on admission  CT head revealed an acute right anterolateral frontal lobe infarction with a potential punctate hyperdense thrombus in the right M2 segment  CTA head/neck confirmed a calcified thrombus in the right MCA, as well as a hypoplastic right vertebral artery and a 2 6cm right neck mass representing adenopathy of uncertain source  MRI brain revealed relatively large early subacute infarct within the right lateral frontal lobe with no hemorrhagic transformation  Echo pending  Per notes, family stated that at baseline the patient has been having short term memory problems  Today on exam, the patient states she is doing well  She admits to having a 4/10 headache but states that she just took tylenol and it seems to be improving  Patient denies chest pain, shortness of breath, vision changes, difficulty with speak, weakness or numbness  Patient is able to recall the events that brought her into the hospital though insight appears to be impaired to some degree         Inpatient consult to Neurology  Consult performed by: Cody Torue ordered by: Lenis Rinne    Inpatient consult to Neurology  Consult performed by: Cody Toure ordered by: Lenis Rinne          Review of Systems    Historical Information   Past Medical History:   Diagnosis Date    Disease of thyroid gland     Hyperlipidemia     Hypertension      Past Surgical History:   Procedure Laterality Date    EYE SURGERY      b/l cataracts    ROTATOR CUFF REPAIR       Social History   History   Alcohol Use No     History   Drug Use No     History   Smoking Status    Never Smoker   Smokeless Tobacco    Never Used     Family History: History reviewed  No pertinent family history  Review of previous medical records was completed  Meds/Allergies   all current active meds have been reviewed    No Known Allergies    Objective   Vitals:Blood pressure 148/67, pulse 57, temperature 99 1 °F (37 3 °C), temperature source Oral, resp  rate 17, height 5' 1 2" (1 554 m), weight 70 5 kg (155 lb 6 8 oz), SpO2 95 %  ,Body mass index is 29 18 kg/m²  No intake or output data in the 24 hours ending 04/17/18 0832    Invasive Devices: Invasive Devices     Peripheral Intravenous Line            Peripheral IV 04/17/18 Left Wrist less than 1 day                Physical Exam   Constitutional: She is oriented to person, place, and time  She appears well-developed and well-nourished  No distress  Supine in bed   HENT:   Head: Normocephalic and atraumatic  Eyes: Conjunctivae and EOM are normal  Pupils are equal, round, and reactive to light  Right eye exhibits no discharge  Left eye exhibits no discharge  No scleral icterus  Neck: Normal range of motion  Neck supple  Cardiovascular: Normal rate and regular rhythm  Exam reveals no gallop and no friction rub  No murmur heard  Pulmonary/Chest: Effort normal and breath sounds normal  No stridor  No respiratory distress  She has no wheezes  She has no rales  She exhibits no tenderness  Musculoskeletal: Normal range of motion  She exhibits no edema, tenderness or deformity  Lymphadenopathy:     She has no cervical adenopathy  Neurological: She is alert and oriented to person, place, and time   She has a normal Finger-Nose-Finger Test  Gait normal    Skin: Skin is warm and dry  No rash noted  No erythema  Psychiatric: She has a normal mood and affect  Her speech is normal and behavior is normal    ?impaired insight     Neurologic Exam     Mental Status   Oriented to person, place, and time  Follows 2 step commands  Attention: normal  Concentration: normal    Speech: speech is normal   Level of consciousness: alert  Normal comprehension  Cranial Nerves     CN II   Visual acuity: normal  Right visual field deficit: none  Left visual field deficit: none     CN III, IV, VI   Pupils are equal, round, and reactive to light  Extraocular motions are normal    Nystagmus: none   Diplopia: none  Conjugate gaze: present    CN V   Facial sensation intact  CN VII   Facial expression full, symmetric  CN VIII   Hearing: intact (grossly)    CN XI   CN XI normal      Motor Exam   Muscle bulk: normal  Overall muscle tone: normal  Right arm pronator drift: absent  Left arm pronator drift: absent    Strength   Strength 5/5 except as noted  LUE:  Deltoid 4  Biceps/triceps 4+   4+    L iliopsoas 4     Sensory Exam   Right arm light touch: normal  Left arm light touch: normal  Right arm vibration: normal  Left arm vibration: normal  Right leg vibration: decreased from toes  Left leg vibration: normal  Decreased RUE proprioception     Temperature intact throughout     Gait, Coordination, and Reflexes     Gait  Gait: normal    Coordination   Finger to nose coordination: normal    Reflexes   Right plantar: normal  Left plantar: normal  Reflexes 2+ throughout       Lab Results:   Recent Results (from the past 24 hour(s))   Ammonia    Collection Time: 04/16/18 11:37 AM   Result Value Ref Range    Ammonia <10 (L) 11 - 35 umol/L   CBC and differential    Collection Time: 04/16/18 11:45 AM   Result Value Ref Range    WBC 5 70 4 31 - 10 16 Thousand/uL    RBC 4 39 3 81 - 5 12 Million/uL    Hemoglobin 12 4 11 5 - 15 4 g/dL    Hematocrit 37 7 34 8 - 46 1 %    MCV 86 82 - 98 fL MCH 28 2 26 8 - 34 3 pg    MCHC 32 9 31 4 - 37 4 g/dL    RDW 13 8 11 6 - 15 1 %    MPV 9 6 8 9 - 12 7 fL    Platelets 183 508 - 185 Thousands/uL    Neutrophils Relative 70 43 - 75 %    Lymphocytes Relative 23 14 - 44 %    Monocytes Relative 7 4 - 12 %    Eosinophils Relative 0 0 - 6 %    Basophils Relative 0 0 - 1 %    Neutrophils Absolute 4 01 1 85 - 7 62 Thousands/µL    Lymphocytes Absolute 1 28 0 60 - 4 47 Thousands/µL    Monocytes Absolute 0 37 0 17 - 1 22 Thousand/µL    Eosinophils Absolute 0 02 0 00 - 0 61 Thousand/µL    Basophils Absolute 0 02 0 00 - 0 10 Thousands/µL   Comprehensive metabolic panel    Collection Time: 04/16/18 11:45 AM   Result Value Ref Range    Sodium 135 (L) 136 - 145 mmol/L    Potassium 3 6 3 5 - 5 3 mmol/L    Chloride 99 (L) 100 - 108 mmol/L    CO2 26 21 - 32 mmol/L    Anion Gap 10 4 - 13 mmol/L    BUN 13 5 - 25 mg/dL    Creatinine 0 90 0 60 - 1 30 mg/dL    Glucose 72 65 - 140 mg/dL    Calcium 9 1 mg/dL    AST 21 5 - 45 U/L    ALT 18 12 - 78 U/L    Alkaline Phosphatase 79 46 - 116 U/L    Total Protein 6 7 6 4 - 8 2 g/dL    Albumin 3 5 3 5 - 5 0 g/dL    Total Bilirubin 0 50 0 20 - 1 00 mg/dL    eGFR 58 ml/min/1 73sq m   TSH    Collection Time: 04/16/18 11:45 AM   Result Value Ref Range    TSH 3RD GENERATON 5 699 (H) 0 358 - 3 740 uIU/mL   Troponin I    Collection Time: 04/16/18 11:45 AM   Result Value Ref Range    Troponin I <0 02 <=0 04 ng/mL   Ethanol    Collection Time: 04/16/18 11:45 AM   Result Value Ref Range    Ethanol Lvl <3 0 0 - 3 mg/dL   T4, free    Collection Time: 04/16/18 11:45 AM   Result Value Ref Range    Free T4 0 90 0 76 - 1 46 ng/dL   ECG 12 lead    Collection Time: 04/16/18 12:02 PM   Result Value Ref Range    Ventricular Rate 58 BPM    Atrial Rate 58 BPM    TX Interval 216 ms    QRSD Interval 112 ms    QT Interval 452 ms    QTC Interval 443 ms    P [place] 57 degrees    QRS Axis -29 degrees    T Wave Axis 86 degrees   UA w Reflex to Microscopic    Collection Time: 04/16/18  1:43 PM   Result Value Ref Range    Color, UA Yellow     Clarity, UA Clear     Specific [place], UA 1 010 1 003 - 1 030    pH, UA 7 0 4 5 - 8 0    Leukocytes, UA Negative Negative    Nitrite, UA Negative Negative    Protein, UA Negative Negative mg/dl    Glucose, UA Negative Negative mg/dl    Ketones, UA Trace (A) Negative mg/dl    Urobilinogen, UA 0 2 0 2, 1 0 E U /dl E U /dl    Bilirubin, UA Negative Negative    Blood, UA Negative Negative   Rapid drug screen, urine    Collection Time: 04/16/18  1:43 PM   Result Value Ref Range    Amph/Meth UR Negative Negative    Barbiturate Ur Negative Negative    Benzodiazepine Urine Negative Negative    Cocaine Urine Negative Negative    Methadone Urine Negative Negative    Opiate Urine Negative Negative    PCP Ur Negative Negative    THC Urine Negative Negative   Lipid Panel with Direct LDL reflex    Collection Time: 04/17/18  5:22 AM   Result Value Ref Range    Cholesterol 158 50 - 200 mg/dL    Triglycerides 50 <=150 mg/dL    HDL, Direct 88 (H) 40 - 60 mg/dL    LDL Calculated 60 0 - 100 mg/dL     Imaging Studies: I have personally reviewed pertinent films in PACS   4/16/18 CT head wo contrast  Acute right anterolateral frontal lobe infarction  No intraparenchymal hemorrhage  Potential punctate hyperdense thrombus right prefrontal branch of the superior M2 segment (image 20 series 2)  Chronic microangiopathy      4/16/18 CTA head/neck  Calcified thrombus at the level of the origin of the right superior frontal branch of the right middle cerebral artery, associated with several cm anterior right frontal acute infarction, similar to recent CT      No evidence of critical stenosis involving the cervical carotid or vertebral segments, although 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

## 2018-04-17 NOTE — DISCHARGE INSTRUCTIONS
Would recommend no driving for now  Consider formal DMV re-evaluation to determine if patient able to drive  Also recommended that the patient have assistance with caregiving to ensure there are no oversights, etc based on her current confusion

## 2018-04-17 NOTE — SOCIAL WORK
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

## 2018-04-17 NOTE — ASSESSMENT & PLAN NOTE
-TSH was elevated (fT4 nl at 0 9) so the patient's Levoxyl was increased from 50 mcg to 75 mcg daily  -repeat thyroid function tests in 4-6 weeks

## 2018-04-17 NOTE — PLAN OF CARE
Problem: PHYSICAL THERAPY ADULT  Goal: Performs mobility at highest level of function for planned discharge setting  See evaluation for individualized goals  Treatment/Interventions: Functional transfer training, LE strengthening/ROM, Elevations, Therapeutic exercise, Patient/family training, Gait training, Spoke to nursing, Spoke to case management, OT  Equipment Recommended: Dyan Bhatia       See flowsheet documentation for full assessment, interventions and recommendations  Outcome: Progressing  Prognosis: Good  Problem List: Decreased mobility, Decreased safety awareness  Assessment: Pt is 80 y o  female seen for PT evaluation s/p admit to [address] on 4/16/2018 w/ Acute CVA (cerebrovascular accident) ([facility] [address] )  PT consulted to assess pt's functional mobility and d/c needs  Order placed for PT eval and tx, w/ up as tolerated order  Comorbidities affecting pt's physical performance at time of assessment include: impaired cognition, Hl, HTN  PTA, pt was ambulates community distances and elevations and lives in multi-level home  Personal factors affecting pt at time of IE include: B&B on 2nd floor, decreased support during the day  Please find objective findings from PT assessment regarding body systems outlined above with impairments and limitations including impaired balance, gait deviations, decreased safety awareness and fall risk  The following objective measures performed on IE also reveal limitations: Barthel Index: 70/100  Pt's clinical presentation is currently evolving seen in pt's presentation of S for mobility tasks  Pt would likely benefit from use of AD (RW vs  SPC)  Pt to benefit from continued PT tx to address deficits as defined above and maximize level of functional independent mobility and consistency  From PT/mobility standpoint, recommendation at time of d/c would be Home PT pending progress in order to facilitate return to PLOF    Barriers to Discharge: Inaccessible home environment, Decreased caregiver support     Recommendation: Home PT     PT - OK to Discharge: Yes    See flowsheet documentation for full assessment

## 2018-04-17 NOTE — PHYSICAL THERAPY NOTE
Physical Therapy Evaluation      Patient Active Problem List   Diagnosis    Acute CVA [facility]cerebro[facility]) ([facility] [address] )    Essential hypertension    Other hyperlipidemia    Acquired hypothyroidism       Past Medical History:   Diagnosis Date    Disease of thyroid gland     Hyperlipidemia     Hypertension        Past Surgical History:   Procedure Laterality Date    EYE SURGERY      b/l cataracts    ROTATOR CUFF REPAIR        04/17/18 0794   Note Type   Note type Eval only   Pain Assessment   Pain Assessment No/denies pain   Pain Score No Pain   Home Living   Type of [address] [place] [address] Two level;1/2 bath on main level;Bed/bath upstairs; Able to live on main level with bedroom/bathroom; Other (Comment)  (reports no OLIVIA)   Bathroom Shower/Tub Tub/shower unit   Bathroom Toilet Standard   Bathroom Equipment Grab bars in shower   [address]  (reports does not use)   Prior Function   Level of [place] Independent with ADLs and functional mobility   Lives With [facility] Help From Family   ADL Assistance Independent   IADLs Independent   Falls in the last 6 months 0   Vocational Retired   Comments +   Restrictions/Precautions   [place] Bearing Precautions Per Order No   Other Precautions Bed Alarm; Fall Risk   General   Additional Pertinent History pt received supine in bed, agreeable to therapy session   Family/Caregiver Present No   Cognition   Overall Cognitive Status WFL   Arousal/Participation Alert   Orientation Level Oriented to place;Oriented to person   Following Commands Follows one step commands with increased time or repetition   RLE Assessment   RLE Assessment WNL   LLE Assessment   LLE Assessment WNL   Bed Mobility   Supine to Sit 6  Modified independent   Additional Comments positioned OOB in bedside chair at end of session    Transfers   Sit to Stand 6  Modified independent   Stand to Sit 6  Modified independent   Stand pivot 5 Supervision   Toilet transfer 5  Supervision   Ambulation/Elevation   Gait pattern Narrow LUIS F; Decreased foot clearance; Foward flexed   Gait Assistance 5  Supervision   Assistive Device None   Distance 125ft x2  20ft x2   Stair Management Assistance 5  Supervision   Stair Management Technique No rails; Reciprocal   Number of Stairs 4   Balance   Static Standing Poor +   Dynamic Standing Poor +   Ambulatory Poor +   Endurance Deficit   Endurance Deficit No   Activity Tolerance   Activity Tolerance Patient tolerated treatment well   Medical Staff Made Aware OT   Nurse Made Aware yes   Assessment   Prognosis Good   Problem List Decreased mobility; Decreased safety awareness   Assessment Pt is 80 y o  female seen for PT evaluation s/p admit [address] on 4/16/2018 w/ Acute CVA (cerebrovascular accident) ([facility] [address] )  PT consulted to assess pt's functional mobility and d/c needs  Order placed for PT eval and tx, w/ up as tolerated order  Comorbidities affecting pt's physical performance at time of assessment include: impaired cognition, Hl, HTN  PTA, pt was ambulates community distances and elevations and lives in multi-level home  Personal factors affecting pt at time of IE include: B&B on 2nd floor, decreased support during the day  Please find objective findings from PT assessment regarding body systems outlined above with impairments and limitations including impaired balance, gait deviations, decreased safety awareness and fall risk  The following objective measures performed on IE also reveal limitations: Barthel Index: 70/100  Pt's clinical presentation is currently evolving seen in pt's presentation of S for mobility tasks  Pt would likely benefit from use of AD (RW vs  SPC)  Pt to benefit from continued PT tx to address deficits as defined above and maximize level of functional independent mobility and consistency   From PT/mobility standpoint, recommendation at time of d/c would be Home PT pending progress in order to facilitate return to PLOF  Barriers to Discharge Inaccessible home environment;Decreased caregiver support   Goals   Patient Goals To go home   STG Expiration Date 04/27/18   Short Term Goal #1 In 7-10 days pt will be mod I with amb >350ft, able to negoitate FF of stairs mod I and perform all transfers and bed mobility mod I    Plan   Treatment/Interventions Functional transfer training;LE strengthening/ROM; Elevations; Therapeutic exercise;Patient/family training;Gait training;Spoke to nursing;Spoke to case management;OT   PT Frequency 5x/wk   Recommendation   Recommendation Home PT   Equipment Recommended Cane;Walker   PT - OK to Discharge Yes   Barthel Index   Feeding 10   Bathing 0   Grooming Score 5   Dressing Score 5   Bladder Score 10   Bowels Score 10   Toilet Use Score 5   Transfers (Bed/Chair) Score 10   Mobility (Level Surface) Score 10   Stairs Score 5   Barthel Index Score 70         Swati [place], PT

## 2018-04-17 NOTE — PROGRESS NOTES
Progress Note - Valdo Daniels 12/16/1930, 80 y o  female MRN: [id number]    Unit/Bed#: [address] [place], [place] [id number] Encounter: [id number]    Primary Care Provider: Montana Hernandez   Date and time admitted to hospital: 4/16/2018 11:04 AM        Acquired hypothyroidism   Assessment & Plan    -TSH was elevated (fT4 nl at 0 9) so the patient's Levoxyl was increased from 50 mcg to 75 mcg daily  -repeat thyroid function tests in 4-6 weeks        Other hyperlipidemia   Assessment & Plan    -cont Lipitor 40 mg daily        Essential hypertension   Assessment & Plan    -continue HCTZ and lisinopril        * Acute CVA (cerebrovascular accident) ([facility] [address] )   Assessment & Plan    -CT Brain: Acute right anterolateral frontal lobe infarction   No intraparenchymal hemorrhage   -cont aspirin and statin  -start Plavix  -MRI: Relatively large early subacute infarct within the right lateral frontal lobe corresponding to the abnormality seen on CT scan earlier today   Mild localized mass effect with no hemorrhagic transformation  Atrophy and chronic microangiopathic change scattered within the remainder of the brain parenchyma    -echocardiogram P  -appreciate Neurology  -Telemetry without afib/flutter  -PT/OT          VTE Pharmacologic Prophylaxis:   Pharmacologic: Enoxaparin (Lovenox)  Mechanical VTE Prophylaxis in Place: Yes    Patient Centered Rounds: I have performed bedside rounds with nursing staff today  Discussions with Specialists or Other Care Team Provider:  Neurology    Education and Discussions with Family / Patient:  Patient's son over the phone    Time Spent for Care: 20 minutes  More than 50% of total time spent on counseling and coordination of care as described above      Current Length of Stay: 1 day(s)    Current Patient Status: Inpatient   Certification Statement: The patient will continue to require additional inpatient hospital stay due to Acute right frontal CVA    Discharge Plan:  Tomorrow    Code Status: Level 1 - Full Code      Subjective:   No new complaints  Objective:     Vitals:   Temp (24hrs), Av 1 °F (37 3 °C), Min:98 4 °F (36 9 °C), Max:99 7 °F (37 6 °C)    HR:  [55-70] 63  Resp:  [17-20] 18  BP: (135-172)/(63-79) 140/63  SpO2:  [92 %-98 %] 95 %  Body mass index is 29 18 kg/m²  Input and Output Summary (last 24 hours):     No intake or output data in the 24 hours ending 18 1322    Physical Exam:     Physical Exam  Gen: NAD, AAOx3, well developed, well nourished  Eyes: EOMI, PERRLA, no scleral icterus  ENMT:  Oropharynx clear of erythema or exudates, no nasal discharge, no otic discharge, moist mucous membranes  Neck:  Supple  Lymph:  No anterior or posterior cervical or supraclavicular lymphadenopathy  Cardiovascular:  Regular rate and rhythm, normal S1-S2, no murmurs, rubs, or gallops  Lungs:  Clear to auscultation bilaterally, no wheezes, or rales, or rhonchi  Abdomen:  Positive bowel sounds, soft, nontender, nondistended, no palpable organomegaly   Skin:  Intact, no obvious lesions or rashes, no edema  Neuro: Cranial nerves 2-12 are intact, non-focal, 5/5 strength in all 4 extremities    Additional Data:     Labs:      Results from last 7 days  Lab Units 18  1145   WBC Thousand/uL 5 70   HEMOGLOBIN g/dL 12 4   HEMATOCRIT % 37 7   PLATELETS Thousands/uL 250   NEUTROS PCT % 70   LYMPHS PCT % 23   MONOS PCT % 7   EOS PCT % 0       Results from last 7 days  Lab Units 18  1145   SODIUM mmol/L 135*   POTASSIUM mmol/L 3 6   CHLORIDE mmol/L 99*   CO2 mmol/L 26   BUN mg/dL 13   CREATININE mg/dL 0 90   CALCIUM mg/dL 9 1   TOTAL PROTEIN g/dL 6 7   BILIRUBIN TOTAL mg/dL 0 50   ALK PHOS U/L 79   ALT U/L 18   AST U/L 21   GLUCOSE RANDOM mg/dL 72           * I Have Reviewed All Lab Data Listed Above  * Additional Pertinent Lab Tests Reviewed:  All [place] Admission Reviewed    Recent Cultures (last 7 days):           Last 24 Hours Medication List:     Current Facility-Administered Medications:  acetaminophen 650 mg Oral Q6H PRN [employer], CRNP   aspirin 81 mg Oral Daily Gabrielle Kim MD   atorvastatin 40 mg Oral QPM Gabrielle Kim MD   clopidogrel 75 mg Oral Daily Xavier Villa PA-C   enoxaparin 40 mg Subcutaneous Daily Gabrielle Kim MD   hydrochlorothiazide 12 5 mg Oral Daily Gabrielle Kim MD   levothyroxine 75 mcg Oral Daily Gabrielle Kim MD   lisinopril 20 mg Oral Daily Gabrielle Kim MD        Today, Patient Was Seen By: Gabrielle Kim MD    ** Please Note: Dictation voice to text software may have been used in the creation of this document   **

## 2018-04-17 NOTE — SOCIAL WORK
Continuing to follow patient  Met with patient 's daughter and granddaughter, Akiko Merritt  Patient resides with her granddaughter Akiko Merritt and great grandchildren ages 8 and 6 in a 2 story home  Patient assisted with transportation for her great grandchildren  Discussed patient's need for VNA and all are agreeable  Referal to VNASL's was made earlier today

## 2018-04-17 NOTE — PLAN OF CARE
Activity Intolerance/Impaired Mobility     Mobility/activity is maintained at optimum level for patient Progressing        CARDIOVASCULAR - ADULT     Maintains optimal cardiac output and hemodynamic stability Progressing        Communication Impairment     Ability to express needs and understand communication [address] Discharge to post-acute care or home with appropriate resources Progressing        Knowledge Deficit     Patient/family/caregiver demonstrates understanding of disease process, treatment plan, medications, and discharge instructions Progressing        Neurological Deficit     Neurological status is stable or improving Progressing        NEUROSENSORY - ADULT     Achieves stable or improved neurological status Progressing     Achieves maximal functionality and self care Progressing        Nutrition     Nutrition/Hydration status is improving Progressing        PAIN - ADULT     Verbalizes/displays adequate comfort level or baseline comfort level Progressing        Potential for Aspiration     Non-ventilated patient's risk of aspiration is minimized Progressing        Potential for Falls     Patient will remain free of falls Progressing        Prexisting or High Potential for Compromised Skin Integrity     Skin integrity is maintained or improved Progressing

## 2018-04-17 NOTE — ASSESSMENT & PLAN NOTE
-CT Brain: Acute right anterolateral frontal lobe infarction   No intraparenchymal hemorrhage   -cont aspirin and statin  -start Plavix  -MRI: Relatively large early subacute infarct within the right lateral frontal lobe corresponding to the abnormality seen on CT scan earlier today   Mild localized mass effect with no hemorrhagic transformation   Atrophy and chronic microangiopathic change scattered within the remainder of the brain parenchyma    -echocardiogram P  -appreciate Neurology  -Telemetry without afib/flutter  -PT/OT

## 2018-04-17 NOTE — CASE MANAGEMENT
Initial Clinical Review    Admission: Date/Time/Statement: 4/16/18 @ 1302     Orders Placed This Encounter   Procedures    Inpatient Admission (expected length of stay for this patient is greater than two midnights)     Standing Status:   Standing     Number of Occurrences:   1     Order Specific Question:   Admitting Physician     Answer:   Mindy Carter [[id number]]     Order Specific Question:   Level of Care     Answer:   Med Surg [16]     Order Specific Question:   Estimated length of stay     Answer:   More than 2 Midnights     Order Specific Question:   Certification     Answer:   I certify that inpatient services are medically necessary for this patient for a duration of greater than two midnights  See H&P and MD Progress Notes for additional information about the patient's course of treatment  ED: Date/Time/Mode of Arrival:   ED Arrival Information     Expected Arrival Acuity Means of Arrival Escorted By Service Admission Type    - 4/16/2018 11:03 Emergent Ambulance [facility]) General Medicine Emergency    Arrival Complaint    altered mental status          Chief Complaint:   Chief Complaint   Patient presents with    Altered Mental Status     Pt coming from home  Daughter reports change in mental status/confusion and possible left side facial droop that seems to have resolved  History of Illness: 80 y o  female who presents with a chief complaint confusion  The patient was in her usual state of health yesterday  The patient usually takes her granddaughter to school but today she was not scheduled to take her to school  She called her family and said that she had for gotten to take the granddaughter to school and was confused  She kept repeating the same thing over and over again  She may have had a left-sided facial droop which has resolved  She denies any other symptomatology    Her symptoms were noted at 10:00 and the last time she was seen in her usual state health was last night   The patient has no other complaints  She does remember the events from earlier and does know at this time that she was not supposed to take her granddaughter to school  Patient denies chest pain, shortness of breath, palpitations, cough, nausea, vomiting, diarrhea, abdominal pain, fevers, chills, night sweats, headache, visual disturbances, neck stiffness, rash, dysuria, heat or cold intolerance, significant weight gain or loss  ED Vital Signs:   ED Triage Vitals [04/16/18 1117]   Temperature Pulse Respirations Blood Pressure SpO2   (!) 97 2 °F (36 2 °C) 62 18 (!) 174/78 100 %      Temp Source Heart Rate Source Patient Position - Orthostatic VS BP Location FiO2 (%)   Tympanic Monitor Lying Left arm --      Pain Score       No Pain        Wt Readings from Last 1 Encounters:   04/17/18 70 5 kg (155 lb 6 8 oz)       Vital Signs (abnormal):   04/16/18 1431  98 7 °F (37 1 °C)  62  17   172/77  --  98 %  None (Room air)  Lying   04/16/18 1315  --  61  22  153/75  107  98 %  --  --   04/16/18 1300  --  63   27   173/75  108  97 %  --  --   04/16/18 1245  --  66  22  164/70  100  99 %  --  --   04/16/18 1117   97 2 °F (36 2 °C)                   Exam patient confused and repetitive    Abnormal Labs/Diagnostic Test Results:   UA trace ketones  TSH 3rd generation 5 699  Na 135  Cl 99    cta head and neck - Calcified thrombus at the level of the origin of the right superior frontal branch of the right middle cerebral artery, associated with several cm anterior right frontal acute infarction, similar to recent CT    No evidence of critical stenosis involving the cervical carotid or vertebral segments, although 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, 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  Approximately 2 6 cm right neck mass likely representing adenopathy, uncertain source  Moderate chronic microangiopathic ischemic changes involving supratentorial white matter    Ct head - Acute right anterolateral frontal lobe infarction   No intraparenchymal hemorrhage  Potential punctate hyperdense thrombus right prefrontal branch of the superior M2 segment (image 20 series 2)  Chronic microangiopathy  8mm right sublenticular prominent perivascular space    MRI brain - Relatively large early subacute infarct within the right lateral frontal lobe corresponding to the abnormality seen on CT scan earlier today  Mild localized mass effect with no hemorrhagic transformation      Atrophy and chronic microangiopathic change scattered within the remainder of the brain parenchyma  ED Treatment:   Medication Administration from 04/16/2018 1103 to 04/16/2018 1348       Date/Time Order Dose Route Action Action by Comments     04/16/2018 1244 iohexol (OMNIPAQUE) 350 MG/ML injection (MULTI-DOSE) 100 mL 100 mL Intravenous Given [email or web address] Brooks Pluck      04/16/2018 1332 aspirin tablet 325 mg 325 mg Oral Given Emilia Islas RN           Past Medical/Surgical History: Active Ambulatory Problems     Diagnosis Date Noted    No Active Ambulatory Problems     Resolved Ambulatory Problems     Diagnosis Date Noted    No Resolved Ambulatory Problems     Past Medical History:   Diagnosis Date    Disease of thyroid gland     Hyperlipidemia     Hypertension        Admitting Diagnosis: Altered mental status [R41 82]  CVA (cerebral vascular accident) ([facility] [address] ) [I63 9]  Acute CVA (cerebrovascular accident) ([address] ) [I63 9]    Age/Sex: 80 y o  female    Assessment/Plan:   Acquired hypothyroidism   Assessment & Plan     -TSH is elevated so will increase the patient's Levoxyl 75 mcg daily  -repeat thyroid function tests in 4-6 weeks  -check free T4 now          Other hyperlipidemia   Assessment & Plan     -change statin to Lipitor 40 mg daily          Essential hypertension   Assessment & Plan     -continue HCTZ and lisinopril          * Acute CVA (cerebrovascular accident) ([facility] [address] )   Assessment & Plan     -CT Brain: Acute right anterolateral frontal lobe infarction   No intraparenchymal hemorrhage   -aspirin and statin  -MRI of the brain  -echocardiogram  -consult Neurology  -monitor on telemetry  -PT/OT         Admission Orders:  4/16/2018  1302 INPATIENT   Scheduled Meds:   Current Facility-Administered Medications:  acetaminophen 650 mg Oral Q6H PRN IRASEMA Murrell   aspirin 81 mg Oral Daily Jerilyn Dorsey MD   atorvastatin 40 mg Oral QPM Jerilyn Dorsey MD   enoxaparin 40 mg Subcutaneous Daily Jerilyn Dorsey MD   hydrochlorothiazide 12 5 mg Oral Daily Jerilyn Dorsey MD   levothyroxine 75 mcg Oral Daily Jerilyn Dorsey MD   lisinopril 20 mg Oral Daily Jerilyn Dorsey MD     Continuous Infusions:    PRN Meds:   acetaminophen     OTHER ORDERS: scds  Telemetry  Neuro checks q 1h x 4, q2h x 4  q4h x 72h  Dysphagia assessment prior to diet  Consult neurology  Echo  PT/OT/speech      Thank you,  [facility] in the [facility] by Felix Coleman for 2017  Network Utilization Review Department  Phone: [phone]; Fax [phone]  ATTENTION: The Network Utilization Review Department is now centralized for our 7 Facilities  Make a note that we have a new phone and fax numbers for our Department  Please call with any questions or concerns to [phone] and carefully follow the prompts so that you are directed to the right person  All voicemails are confidential  Fax any determinations, approvals, denials, and requests for initial or continue stay review clinical to [phone]  Due to HIGH CALL volume, it would be easier if you could please send faxed requests to expedite your requests and in part, help us provide discharge notifications faster

## 2018-04-18 VITALS
DIASTOLIC BLOOD PRESSURE: 66 MMHG | SYSTOLIC BLOOD PRESSURE: 130 MMHG | RESPIRATION RATE: 18 BRPM | HEIGHT: 61 IN | WEIGHT: 148.15 LBS | TEMPERATURE: 97.8 F | BODY MASS INDEX: 27.97 KG/M2 | OXYGEN SATURATION: 95 % | HEART RATE: 66 BPM

## 2018-04-18 PROCEDURE — 97530 THERAPEUTIC ACTIVITIES: CPT

## 2018-04-18 PROCEDURE — 99239 HOSP IP/OBS DSCHRG MGMT >30: CPT | Performed by: HOSPITALIST

## 2018-04-18 RX ORDER — ATORVASTATIN CALCIUM 40 MG/1
40 TABLET, FILM COATED ORAL EVERY EVENING
Qty: 30 TABLET | Refills: 0 | Status: SHIPPED | OUTPATIENT
Start: 2018-04-18

## 2018-04-18 RX ORDER — ASPIRIN 81 MG/1
81 TABLET, CHEWABLE ORAL DAILY
Qty: 30 TABLET | Refills: 0 | Status: SHIPPED | OUTPATIENT
Start: 2018-04-18 | End: 2019-07-26 | Stop reason: HOSPADM

## 2018-04-18 RX ORDER — CLOPIDOGREL BISULFATE 75 MG/1
75 TABLET ORAL DAILY
Qty: 30 TABLET | Refills: 0 | Status: SHIPPED | OUTPATIENT
Start: 2018-04-18 | End: 2019-07-26 | Stop reason: HOSPADM

## 2018-04-18 RX ORDER — LEVOTHYROXINE SODIUM 0.07 MG/1
75 TABLET ORAL DAILY
Qty: 30 TABLET | Refills: 0 | Status: SHIPPED | OUTPATIENT
Start: 2018-04-18

## 2018-04-18 RX ADMIN — ENOXAPARIN SODIUM 40 MG: 40 INJECTION SUBCUTANEOUS at 09:17

## 2018-04-18 RX ADMIN — HYDROCHLOROTHIAZIDE 12.5 MG: 25 TABLET ORAL at 09:17

## 2018-04-18 RX ADMIN — CLOPIDOGREL 75 MG: 75 TABLET, FILM COATED ORAL at 09:17

## 2018-04-18 RX ADMIN — ASPIRIN 81 MG 81 MG: 81 TABLET ORAL at 09:17

## 2018-04-18 RX ADMIN — LISINOPRIL 20 MG: 20 TABLET ORAL at 09:17

## 2018-04-18 RX ADMIN — LEVOTHYROXINE SODIUM 75 MCG: 75 TABLET ORAL at 05:42

## 2018-04-18 NOTE — PLAN OF CARE
Problem: OCCUPATIONAL THERAPY ADULT  Goal: Performs self-care activities at highest level of function for planned discharge setting  See evaluation for individualized goals  Treatment Interventions: Cognitive reorientation, ADL retraining          See flowsheet documentation for full assessment, interventions and recommendations  Outcome: Progressing  Limitation: Decreased ADL status, Decreased Safe judgement during ADL, Decreased high-level ADLs  Prognosis: Good  Assessment: Patient seen for further cogntive screen with rachel cogntive Assessment ([address] [place] [address], [place])  Patient scored 21/30 with noted difficulty with task initiation, following compelx directions, paying attention to deails and decuction skills  Patient did well in areas of naming objects, STM and abstract throught  From OT standpoint recommend dischager home with [facility] services, may benefit from [facility] PT/OT and ST (for further cogntive skill development s/p CVA)  Recommendation: PT consult, Speech consult (may benefit from [address] in [facility] vs  out-patient )  OT Discharge Recommendation: Home with daily checks (recommend family A with IADLs to ensure safety )  OT - OK to Discharge:  Yes

## 2018-04-18 NOTE — SOCIAL WORK
Late note from Tuesday, met again with patient's granddaughter, Dexter Carbajal, She was upset about the cognative changes in her grandmother  Provided support and encouraged her to work a schedule with other family members to be with patient while she is at work and Dexter Carbajal felt there was enough family support to arrange that

## 2018-04-18 NOTE — ASSESSMENT & PLAN NOTE
-CT Brain: Acute right anterolateral frontal lobe infarction   No intraparenchymal hemorrhage   -cont aspirin/statin/Plavix  -MRI: Relatively large early subacute infarct within the right lateral frontal lobe corresponding to the abnormality seen on CT scan earlier today   Mild localized mass effect with no hemorrhagic transformation   Atrophy and chronic microangiopathic change scattered within the remainder of the brain parenchyma    -Echo:  EF 65%, grade 1 diastolic dysfunction, no regional wall motion abnormalities, LA moderately dilated  -appreciate Neurology  -Telemetry without afib/flutter  -PT/OT

## 2018-04-18 NOTE — NURSING NOTE
RN reviewed signs/symptoms of CVA with patient and family; stressed to patient to be patient with herself, work with physical therapist, do crossword puzzles, read  RN stressed that family schedule follow-up appointments with primary MD, neurologist, and neuro-psych MD  RN told patient she cannot drive at this time and to wait for her follow-up appointments with the doctors  Patient verbalized understanding, as well as her daughter and granddaughter at bedside  Prescriptions were reviewed and daily ongoing blood pressured medications

## 2018-04-18 NOTE — DISCHARGE SUMMARY
Discharge- Al Man 12/16/1930, 80 y o  female MRN: [id number]    Unit/Bed#: [address] [place], [place] [address] Encounter: [id number]    Primary Care Provider: Rivka Jennings   Date and time admitted to hospital: 4/16/2018 11:04 AM        Acquired hypothyroidism   Assessment & Plan    -TSH was elevated (fT4 nl at 0 9) so the patient's Levoxyl was increased from 50 mcg to 75 mcg daily  -repeat thyroid function tests in 4-6 weeks        Other hyperlipidemia   Assessment & Plan    -cont Lipitor 40 mg daily        Essential hypertension   Assessment & Plan    -continue HCTZ and lisinopril        * Acute CVA (cerebrovascular accident) ([facility] [address] )   Assessment & Plan    -CT Brain: Acute right anterolateral frontal lobe infarction   No intraparenchymal hemorrhage   -cont aspirin/statin/Plavix  -MRI: Relatively large early subacute infarct within the right lateral frontal lobe corresponding to the abnormality seen on CT scan earlier today   Mild localized mass effect with no hemorrhagic transformation  Atrophy and chronic microangiopathic change scattered within the remainder of the brain parenchyma    -Echo:  EF 03%, grade 1 diastolic dysfunction, no regional wall motion abnormalities, LA moderately dilated  -appreciate Neurology  -Telemetry without afib/flutter  -PT/OT          Discharging Physician / Practitioner: Yris Morgan MD  PCP: Rivka Jennings  Admission Date:   Admission Orders     Ordered        04/16/18 1302  Inpatient Admission (expected length of stay for this patient is greater than two midnights)  Once             Discharge Date: 04/18/18    Resolved Problems  Date Reviewed: 4/18/2018    None          Consultations During Hospital Stay:  · Neurology    Procedures Performed:     · None    Significant Findings / Test Results:     · See above    Incidental Findings:   · None     Test Results Pending at Discharge (will require follow up):    · None     Outpatient Tests Requested:  · Thyroid function tests in 4-6 weeks    Complications:  None    Reason for Admission:  Acute CVA    Hospital Course:     Gisela Eric is a 80 y o  female patient who originally presented to the hospital on 4/16/2018 due to acute CVA as outlined in the H&P done on admission  Please see above list of diagnoses and related plan for additional information  Condition at Discharge: good     Discharge Day Visit / Exam:     Subjective:  No new complaints today  Vitals: Blood Pressure: 130/66 (04/18/18 0732)  Pulse: 66 (04/18/18 0732)  Temperature: 97 8 °F (36 6 °C) (04/18/18 0732)  Temp Source: Oral (04/18/18 0732)  Respirations: 18 (04/18/18 0732)  Height: 5' 1 2" (155 4 cm) (04/16/18 1431)  Weight - Scale: 67 2 kg (148 lb 2 4 oz) (04/18/18 0732)  SpO2: 95 % (04/18/18 0732)  Exam:   Physical Exam  Gen: NAD, AAOx3, well developed, well nourished  Eyes: EOMI, PERRLA, no scleral icterus  ENMT:  Oropharynx clear of erythema or exudates, no nasal discharge, no otic discharge, moist mucous membranes  Neck:  Supple  Lymph:  No anterior or posterior cervical or supraclavicular lymphadenopathy  Cardiovascular:  Regular rate and rhythm, normal S1-S2, no murmurs, rubs, or gallops  Lungs:  Clear to auscultation bilaterally, no wheezes, or rales, or rhonchi  Abdomen:  Positive bowel sounds, soft, nontender, nondistended, no palpable organomegaly   Skin:  Intact, no obvious lesions or rashes, no edema  Neuro: Cranial nerves 2-12 are intact, non-focal, 5/5 strength in all 4 extremities  Discussion with Family: Pt's son over the phone  Discharge instructions/Information to patient and family:   See after visit summary for information provided to patient and family  Provisions for Follow-Up Care:  See after visit summary for information related to follow-up care and any pertinent home health orders        Disposition:     Home with VNA Services (Reminder: Complete face to face encounter)    For Discharges to [facility]:   · Not Applicable to this Patient - Not Applicable to this Patient    Planned Readmission: None     Discharge Statement:  I spent 30 minutes discharging the patient  This time was spent on the day of discharge  I had direct contact with the patient on the day of discharge  Greater than 50% of the total time was spent examining patient, answering all patient questions, arranging and discussing plan of care with patient as well as directly providing post-discharge instructions  Additional time then spent on discharge activities  Discharge Medications:  See after visit summary for reconciled discharge medications provided to patient and family        ** Please Note: This note has been constructed using a voice recognition system **

## 2018-04-18 NOTE — PLAN OF CARE
Problem: DISCHARGE PLANNING - CARE MANAGEMENT  Goal: Discharge to post-acute care or home with appropriate resources  INTERVENTIONS:  - Conduct assessment to determine patient/family and health care team treatment goals, and need for post-acute services based on payer coverage, community resources, and patient preferences, and barriers to discharge  - Address psychosocial, clinical, and financial barriers to discharge as identified in assessment in conjunction with the patient/family and health care team  - Arrange appropriate level of post-acute services according to patient's   needs and preference and payer coverage in collaboration with the physician and health care team  - Communicate with and update the patient/family, physician, and health care team regarding progress on the discharge plan  - Arrange appropriate transportation to post-acute venues   Outcome: Adequate for Discharge

## 2018-04-18 NOTE — PLAN OF CARE

## 2018-04-18 NOTE — PLAN OF CARE
Problem: OCCUPATIONAL THERAPY ADULT  Goal: Performs self-care activities at highest level of function for planned discharge setting  See evaluation for individualized goals  Treatment Interventions: Cognitive reorientation, ADL retraining          See flowsheet documentation for full assessment, interventions and recommendations  Outcome: Progressing  Limitation: Decreased ADL status, Decreased Safe judgement during ADL, Decreased high-level ADLs  Prognosis: Good  Assessment: Patient seen for further cogntive screen with rachel cogntive Assessment ([address] [place] [address], [place])  Patient scored 21/30 with noted difficulty with task initiation, following compelx directions, paying attention to deails and decuction skills  Patient did well in areas of naming objects, STM and abstract throught  From OT standpoint recommend dischager home with [facility] services, may benefit from [facility] PT/OT and ST (for further cogntive skill development s/p CVA)  Recommendation: PT consult, Speech consult (may benefit from [address] in [facility] vs  out-patient )  OT Discharge Recommendation: Home with daily checks (recommend family A with IADLs to ensure safety )  OT - OK to Discharge:  Yes

## 2018-04-19 ENCOUNTER — TELEPHONE (OUTPATIENT)
Dept: NEUROLOGY | Facility: CLINIC | Age: 83
End: 2018-04-19

## 2019-07-25 ENCOUNTER — HOSPITAL ENCOUNTER (INPATIENT)
Facility: HOSPITAL | Age: 84
LOS: 1 days | Discharge: HOME | DRG: 812 | End: 2019-07-26
Attending: EMERGENCY MEDICINE | Admitting: HOSPITALIST
Payer: COMMERCIAL

## 2019-07-25 ENCOUNTER — APPOINTMENT (EMERGENCY)
Dept: RADIOLOGY | Facility: HOSPITAL | Age: 84
DRG: 812 | End: 2019-07-25
Payer: COMMERCIAL

## 2019-07-25 ENCOUNTER — APPOINTMENT (EMERGENCY)
Dept: CT IMAGING | Facility: HOSPITAL | Age: 84
DRG: 812 | End: 2019-07-25
Payer: COMMERCIAL

## 2019-07-25 DIAGNOSIS — D50.8 IRON DEFICIENCY ANEMIA SECONDARY TO INADEQUATE DIETARY IRON INTAKE: ICD-10-CM

## 2019-07-25 DIAGNOSIS — Z86.73 HISTORY OF CVA (CEREBROVASCULAR ACCIDENT) WITHOUT RESIDUAL DEFICITS: ICD-10-CM

## 2019-07-25 DIAGNOSIS — D64.9 SYMPTOMATIC ANEMIA: Primary | ICD-10-CM

## 2019-07-25 DIAGNOSIS — I63.9 CEREBROVASCULAR ACCIDENT (CVA), UNSPECIFIED MECHANISM (HCC): ICD-10-CM

## 2019-07-25 DIAGNOSIS — I48.91 ATRIAL FIBRILLATION (HCC): ICD-10-CM

## 2019-07-25 DIAGNOSIS — R29.90 STROKE-LIKE SYMPTOMS: ICD-10-CM

## 2019-07-25 DIAGNOSIS — I48.0 PAROXYSMAL ATRIAL FIBRILLATION (HCC): ICD-10-CM

## 2019-07-25 PROBLEM — R42 LIGHTHEADEDNESS: Status: ACTIVE | Noted: 2019-07-25

## 2019-07-25 LAB
ABO GROUP BLD: NORMAL
ALBUMIN SERPL BCP-MCNC: 3.8 G/DL (ref 3.5–5)
ALP SERPL-CCNC: 119 U/L (ref 46–116)
ALT SERPL W P-5'-P-CCNC: 16 U/L (ref 12–78)
ANION GAP SERPL CALCULATED.3IONS-SCNC: 6 MMOL/L (ref 4–13)
AST SERPL W P-5'-P-CCNC: 18 U/L (ref 5–45)
BASOPHILS # BLD AUTO: 0.03 THOUSANDS/ΜL (ref 0–0.1)
BASOPHILS NFR BLD AUTO: 1 % (ref 0–1)
BILIRUB SERPL-MCNC: 0.8 MG/DL (ref 0.2–1)
BILIRUB UR QL STRIP: NEGATIVE
BLD GP AB SCN SERPL QL: NEGATIVE
BUN SERPL-MCNC: 11 MG/DL (ref 5–25)
CALCIUM SERPL-MCNC: 9.5 MG/DL (ref 8.3–10.1)
CHLORIDE SERPL-SCNC: 100 MMOL/L (ref 100–108)
CLARITY UR: CLEAR
CO2 SERPL-SCNC: 28 MMOL/L (ref 21–32)
COLOR UR: YELLOW
CREAT SERPL-MCNC: 0.97 MG/DL (ref 0.6–1.3)
EOSINOPHIL # BLD AUTO: 0.1 THOUSAND/ΜL (ref 0–0.61)
EOSINOPHIL NFR BLD AUTO: 2 % (ref 0–6)
ERYTHROCYTE [DISTWIDTH] IN BLOOD BY AUTOMATED COUNT: 18.5 % (ref 11.6–15.1)
FERRITIN SERPL-MCNC: 8 NG/ML (ref 8–388)
GFR SERPL CREATININE-BSD FRML MDRD: 52 ML/MIN/1.73SQ M
GLUCOSE SERPL-MCNC: 85 MG/DL (ref 65–140)
GLUCOSE UR STRIP-MCNC: NEGATIVE MG/DL
HCT VFR BLD AUTO: 33.7 % (ref 34.8–46.1)
HGB BLD-MCNC: 10.2 G/DL (ref 11.5–15.4)
HGB UR QL STRIP.AUTO: NEGATIVE
IMM GRANULOCYTES # BLD AUTO: 0.02 THOUSAND/UL (ref 0–0.2)
IMM GRANULOCYTES NFR BLD AUTO: 0 % (ref 0–2)
IRON SATN MFR SERPL: 6 %
IRON SERPL-MCNC: 25 UG/DL (ref 50–170)
KETONES UR STRIP-MCNC: NEGATIVE MG/DL
LEUKOCYTE ESTERASE UR QL STRIP: NEGATIVE
LYMPHOCYTES # BLD AUTO: 1.25 THOUSANDS/ΜL (ref 0.6–4.47)
LYMPHOCYTES NFR BLD AUTO: 21 % (ref 14–44)
MCH RBC QN AUTO: 22.8 PG (ref 26.8–34.3)
MCHC RBC AUTO-ENTMCNC: 30.3 G/DL (ref 31.4–37.4)
MCV RBC AUTO: 75 FL (ref 82–98)
MONOCYTES # BLD AUTO: 0.52 THOUSAND/ΜL (ref 0.17–1.22)
MONOCYTES NFR BLD AUTO: 9 % (ref 4–12)
NEUTROPHILS # BLD AUTO: 3.97 THOUSANDS/ΜL (ref 1.85–7.62)
NEUTS SEG NFR BLD AUTO: 67 % (ref 43–75)
NITRITE UR QL STRIP: NEGATIVE
NRBC BLD AUTO-RTO: 0 /100 WBCS
PH UR STRIP.AUTO: 6.5 [PH]
PLATELET # BLD AUTO: 309 THOUSANDS/UL (ref 149–390)
PMV BLD AUTO: 9.4 FL (ref 8.9–12.7)
POTASSIUM SERPL-SCNC: 3.9 MMOL/L (ref 3.5–5.3)
PROT SERPL-MCNC: 6.9 G/DL (ref 6.4–8.2)
PROT UR STRIP-MCNC: NEGATIVE MG/DL
RBC # BLD AUTO: 4.47 MILLION/UL (ref 3.81–5.12)
RH BLD: POSITIVE
SODIUM SERPL-SCNC: 134 MMOL/L (ref 136–145)
SP GR UR STRIP.AUTO: 1.01 (ref 1–1.03)
SPECIMEN EXPIRATION DATE: NORMAL
TIBC SERPL-MCNC: 437 UG/DL (ref 250–450)
TROPONIN I SERPL-MCNC: <0.02 NG/ML
TSH SERPL DL<=0.05 MIU/L-ACNC: 0.97 UIU/ML (ref 0.36–3.74)
UROBILINOGEN UR QL STRIP.AUTO: 0.2 E.U./DL
WBC # BLD AUTO: 5.89 THOUSAND/UL (ref 4.31–10.16)

## 2019-07-25 PROCEDURE — 84443 ASSAY THYROID STIM HORMONE: CPT | Performed by: EMERGENCY MEDICINE

## 2019-07-25 PROCEDURE — 86901 BLOOD TYPING SEROLOGIC RH(D): CPT | Performed by: EMERGENCY MEDICINE

## 2019-07-25 PROCEDURE — 80053 COMPREHEN METABOLIC PANEL: CPT | Performed by: EMERGENCY MEDICINE

## 2019-07-25 PROCEDURE — 83540 ASSAY OF IRON: CPT | Performed by: HOSPITALIST

## 2019-07-25 PROCEDURE — 82728 ASSAY OF FERRITIN: CPT | Performed by: HOSPITALIST

## 2019-07-25 PROCEDURE — 99284 EMERGENCY DEPT VISIT MOD MDM: CPT | Performed by: EMERGENCY MEDICINE

## 2019-07-25 PROCEDURE — 71046 X-RAY EXAM CHEST 2 VIEWS: CPT

## 2019-07-25 PROCEDURE — 36415 COLL VENOUS BLD VENIPUNCTURE: CPT | Performed by: HOSPITALIST

## 2019-07-25 PROCEDURE — 93005 ELECTROCARDIOGRAM TRACING: CPT

## 2019-07-25 PROCEDURE — 70450 CT HEAD/BRAIN W/O DYE: CPT

## 2019-07-25 PROCEDURE — 99285 EMERGENCY DEPT VISIT HI MDM: CPT

## 2019-07-25 PROCEDURE — 86850 RBC ANTIBODY SCREEN: CPT | Performed by: EMERGENCY MEDICINE

## 2019-07-25 PROCEDURE — 86900 BLOOD TYPING SEROLOGIC ABO: CPT | Performed by: EMERGENCY MEDICINE

## 2019-07-25 PROCEDURE — 85025 COMPLETE CBC W/AUTO DIFF WBC: CPT | Performed by: EMERGENCY MEDICINE

## 2019-07-25 PROCEDURE — 83550 IRON BINDING TEST: CPT | Performed by: HOSPITALIST

## 2019-07-25 PROCEDURE — 99223 1ST HOSP IP/OBS HIGH 75: CPT | Performed by: HOSPITALIST

## 2019-07-25 PROCEDURE — 84484 ASSAY OF TROPONIN QUANT: CPT | Performed by: EMERGENCY MEDICINE

## 2019-07-25 PROCEDURE — 81003 URINALYSIS AUTO W/O SCOPE: CPT | Performed by: EMERGENCY MEDICINE

## 2019-07-25 RX ORDER — ASPIRIN 81 MG/1
81 TABLET, CHEWABLE ORAL DAILY
Status: DISCONTINUED | OUTPATIENT
Start: 2019-07-26 | End: 2019-07-26

## 2019-07-25 RX ORDER — CLOPIDOGREL BISULFATE 75 MG/1
75 TABLET ORAL DAILY
Status: DISCONTINUED | OUTPATIENT
Start: 2019-07-26 | End: 2019-07-26

## 2019-07-25 RX ORDER — ATORVASTATIN CALCIUM 40 MG/1
40 TABLET, FILM COATED ORAL EVERY EVENING
Status: DISCONTINUED | OUTPATIENT
Start: 2019-07-25 | End: 2019-07-26 | Stop reason: HOSPADM

## 2019-07-25 RX ORDER — HYDROCHLOROTHIAZIDE 12.5 MG/1
12.5 TABLET ORAL DAILY
Status: DISCONTINUED | OUTPATIENT
Start: 2019-07-26 | End: 2019-07-26 | Stop reason: HOSPADM

## 2019-07-25 RX ORDER — LISINOPRIL 10 MG/1
10 TABLET ORAL
COMMUNITY
End: 2019-08-08 | Stop reason: SDUPTHER

## 2019-07-25 RX ORDER — LISINOPRIL 20 MG/1
20 TABLET ORAL DAILY
Status: DISCONTINUED | OUTPATIENT
Start: 2019-07-26 | End: 2019-07-26 | Stop reason: HOSPADM

## 2019-07-25 RX ORDER — ASPIRIN 81 MG/1
324 TABLET, CHEWABLE ORAL ONCE
Status: COMPLETED | OUTPATIENT
Start: 2019-07-25 | End: 2019-07-25

## 2019-07-25 RX ORDER — LEVOTHYROXINE SODIUM 0.07 MG/1
75 TABLET ORAL
Status: DISCONTINUED | OUTPATIENT
Start: 2019-07-26 | End: 2019-07-26 | Stop reason: HOSPADM

## 2019-07-25 RX ORDER — ASPIRIN 81 MG/1
81 TABLET, CHEWABLE ORAL DAILY
Status: DISCONTINUED | OUTPATIENT
Start: 2019-07-26 | End: 2019-07-25 | Stop reason: SDUPTHER

## 2019-07-25 RX ORDER — LISINOPRIL 10 MG/1
10 TABLET ORAL
Status: DISCONTINUED | OUTPATIENT
Start: 2019-07-25 | End: 2019-07-26 | Stop reason: HOSPADM

## 2019-07-25 RX ADMIN — LISINOPRIL 10 MG: 10 TABLET ORAL at 23:00

## 2019-07-25 RX ADMIN — ASPIRIN 81 MG 324 MG: 81 TABLET ORAL at 16:35

## 2019-07-25 RX ADMIN — ATORVASTATIN CALCIUM 40 MG: 40 TABLET, FILM COATED ORAL at 18:39

## 2019-07-25 NOTE — ASSESSMENT & PLAN NOTE
-patient reports she had a few minutes of lightheadedness earlier this morning  It resolved and has not recurred   -denies positional lightheadedness  -patient reports systolic blood pressure just after the episode was 90, this event could have been due to orthostatic hypotension  -the ER attending discussed the case with Neurology and Neurology recommended admission under the stroke pathway    -CT Brain: No acute intracranial abnormality   -check MRI brain  -patient already on aspirin/Plavix/statin, continue  -monitor on tele  -check TSH

## 2019-07-25 NOTE — ED PROVIDER NOTES
History  Chief Complaint   Patient presents with    Dizziness     pt c/o feeling lightheaded  BP at home was 90/65, pt has a drop in her blood counts, PCP worried about possible GI bleed  Patient complains of lightheadedness and heaviness in head started about 2 hours ago symptoms worse standing relieved with laying supine  Recent outpatient blood work hb went from 12 to 9 8  Felt ill similar symptoms yesterday with high bp then low bp today  Prior to Admission Medications   Prescriptions Last Dose Informant Patient Reported? Taking?   aspirin 81 mg chewable tablet 7/25/2019 at 900  No Yes   Sig: Chew 1 tablet (81 mg total) daily   atorvastatin (LIPITOR) 40 mg tablet 7/24/2019 at Unknown time  No Yes   Sig: Take 1 tablet (40 mg total) by mouth every evening   clopidogrel (PLAVIX) 75 mg tablet 7/25/2019 at 900  No Yes   Sig: Take 1 tablet (75 mg total) by mouth daily   hydrochlorothiazide (HYDRODIURIL) 25 mg tablet 7/25/2019 at 900  Yes Yes   Sig: Take 12 5 mg by mouth daily     levothyroxine 75 mcg tablet 7/25/2019 at 0245  No Yes   Sig: Take 1 tablet (75 mcg total) by mouth daily   lisinopril (ZESTRIL) 10 mg tablet 7/24/2019 at Unknown time  Yes Yes   Sig: Take 10 mg by mouth daily at bedtime   lisinopril (ZESTRIL) 20 mg tablet 7/25/2019 at 900  Yes Yes   Sig: Take 20 mg by mouth daily        Facility-Administered Medications: None       Past Medical History:   Diagnosis Date    Disease of thyroid gland     Hyperlipidemia     Hypertension     Stroke [facility])        Past Surgical History:   Procedure Laterality Date    EYE SURGERY      b/l cataracts    ROTATOR CUFF REPAIR         History reviewed  No pertinent family history  I have reviewed and agree with the history as documented  Social History     Tobacco Use    Smoking status: Never Smoker    Smokeless tobacco: Never Used   Substance Use Topics    Alcohol use:  Yes     Alcohol/week: 1 0 standard drinks     Types: 1 Glasses of wine per week     Comment: a glass at night    Drug use: No        Review of Systems   All other systems reviewed and are negative  Physical Exam  Physical Exam   Constitutional: She is oriented to person, place, and time  She appears well-developed and well-nourished  HENT:   Mouth/Throat: Oropharynx is clear and moist    Eyes: Pupils are equal, round, and reactive to light  Conjunctivae and EOM are normal    Neck: Normal range of motion  Neck supple  No spinous process tenderness present  Cardiovascular: Normal rate, regular rhythm, normal heart sounds and intact distal pulses  Pulmonary/Chest: Effort normal and breath sounds normal  No respiratory distress  She has no wheezes  Abdominal: Soft  Bowel sounds are normal  She exhibits no distension  There is no tenderness  Genitourinary: Rectal exam shows guaiac negative stool  Musculoskeletal: Normal range of motion  Neurological: She is alert and oriented to person, place, and time  She has normal strength  A sensory deficit is present  No cranial nerve deficit  She displays a negative Romberg sign  GCS eye subscore is 4  GCS verbal subscore is 5  GCS motor subscore is 6  Subjective dysesthesia left upper and lower extremity less sharp sensation with pinprick compared to right   Skin: Skin is warm and dry  No rash noted  Psychiatric: She has a normal mood and affect  Nursing note and vitals reviewed        Vital Signs  ED Triage Vitals   Temperature Pulse Respirations Blood Pressure SpO2   07/25/19 1428 07/25/19 1341 07/25/19 1341 07/25/19 1341 07/25/19 1341   (!) 97 °F (36 1 °C) 71 18 143/73 99 %      Temp Source Heart Rate Source Patient Position - Orthostatic VS BP Location FiO2 (%)   07/25/19 1428 07/25/19 1515 07/25/19 1450 07/25/19 1450 --   Tympanic Monitor Lying - Orthostatic VS Right arm       Pain Score       07/25/19 1341       No Pain           Vitals:    07/25/19 1515 07/25/19 1718 07/25/19 1805 07/25/19 2024   BP: 150/90 162/92 159/76 123/78   Pulse: 90 77 75 76   Patient Position - Orthostatic VS: Sitting Lying Lying Lying         Visual Acuity  Visual Acuity      Most Recent Value   L Pupil Size (mm)  2   R Pupil Size (mm)  2   L Pupil Shape  Round   R Pupil Shape  Round          ED Medications  Medications   hydrochlorothiazide (HYDRODIURIL) tablet 12 5 mg (has no administration in time range)   lisinopril (ZESTRIL) tablet 20 mg (has no administration in time range)   levothyroxine tablet 75 mcg (has no administration in time range)   atorvastatin (LIPITOR) tablet 40 mg (40 mg Oral Given 7/25/19 1839)   clopidogrel (PLAVIX) tablet 75 mg (has no administration in time range)   lisinopril (ZESTRIL) tablet 10 mg (has no administration in time range)   enoxaparin (LOVENOX) subcutaneous injection 40 mg (has no administration in time range)   aspirin chewable tablet 81 mg (has no administration in time range)   aspirin chewable tablet 324 mg (324 mg Oral Given 7/25/19 1635)       Diagnostic Studies  Results Reviewed     Procedure Component Value Units Date/Time    UA w Reflex to Microscopic w Reflex to Culture [[id number]] Collected:  07/25/19 1715    Lab Status:  Final result Specimen:  Urine, Clean Catch Updated:  07/25/19 1732     Color, UA Yellow     Clarity, UA Clear     Specific Gravity, UA 1 010     pH, UA 6 5     Leukocytes, UA Negative     Nitrite, UA Negative     Protein, UA Negative mg/dl      Glucose, UA Negative mg/dl      Ketones, UA Negative mg/dl      Urobilinogen, UA 0 2 E U /dl      Bilirubin, UA Negative     Blood, UA Negative    Iron Saturation % [[id number]] Collected:  07/25/19 1351    Lab Status: In process Specimen:  Blood from Arm, Left Updated:  07/25/19 1703    Ferritin [[id number]] Collected:  07/25/19 1351    Lab Status:   In process Specimen:  Blood from Arm, Left Updated:  07/25/19 1703    TSH [[id number]]  (Normal) Collected:  07/25/19 1351    Lab Status:  Final result Specimen:  Blood from Arm, Left Updated:  07/25/19 46     [facility]RD [facility] 0 967 uIU/mL     Narrative:       Patients undergoing fluorescein dye angiography may retain small amounts of fluorescein in the body for 48-72 hours post procedure  Samples containing fluorescein can produce falsely depressed TSH values  If the patient had this procedure,a specimen should be resubmitted post fluorescein clearance        Troponin I [[id number]]  (Normal) Collected:  07/25/19 1351    Lab Status:  Final result Specimen:  Blood from Arm, Left Updated:  07/25/19 1421     Troponin I <0 02 ng/mL     Comprehensive metabolic panel [[id number]]  (Abnormal) Collected:  07/25/19 1351    Lab Status:  Final result Specimen:  Blood from Arm, Left Updated:  07/25/19 1418     Sodium 134 mmol/L      Potassium 3 9 mmol/L      Chloride 100 mmol/L      CO2 28 mmol/L      ANION GAP 6 mmol/L      BUN 11 mg/dL      Creatinine 0 97 mg/dL      Glucose 85 mg/dL      Calcium 9 5 mg/dL      AST 18 U/L      ALT 16 U/L      Alkaline Phosphatase 119 U/L      Total Protein 6 9 g/dL      Albumin 3 8 g/dL      Total Bilirubin 0 80 mg/dL      eGFR 52 ml/min/1 73sq m     Narrative:       [facility] guidelines for Chronic Kidney Disease (CKD):     Stage 1 with normal or high GFR (GFR > 90 mL/min/1 73 square meters)    Stage 2 Mild CKD (GFR = 60-89 mL/min/1 73 square meters)    Stage 3A Moderate CKD (GFR = 45-59 mL/min/1 73 square meters)    Stage 3B Moderate CKD (GFR = 30-44 mL/min/1 73 square meters)    Stage 4 Severe CKD (GFR = 15-29 mL/min/1 73 square meters)    Stage 5 End Stage CKD (GFR <15 mL/min/1 73 square meters)  Note: GFR calculation is accurate only with a steady state creatinine    CBC and differential [[id number]]  (Abnormal) Collected:  07/25/19 1351    Lab Status:  Final result Specimen:  Blood from Arm, Left Updated:  07/25/19 1404     WBC 5 89 Thousand/uL      RBC 4 47 Million/uL      Hemoglobin 10 2 g/dL      Hematocrit 33 7 %      MCV 75 fL      MCH 22 8 pg MCHC 30 3 g/dL      RDW 18 5 %      MPV 9 4 fL      Platelets 396 Thousands/uL      nRBC 0 /100 WBCs      Neutrophils Relative 67 %      Immat GRANS % 0 %      Lymphocytes Relative 21 %      Monocytes Relative 9 %      Eosinophils Relative 2 %      Basophils Relative 1 %      Neutrophils Absolute 3 97 Thousands/µL      Immature Grans Absolute 0 02 Thousand/uL      Lymphocytes Absolute 1 25 Thousands/µL      Monocytes Absolute 0 52 Thousand/µL      Eosinophils Absolute 0 10 Thousand/µL      Basophils Absolute 0 03 Thousands/µL                  CT head without contrast   Final Result by May Jackman MD (07/25 3559)      No acute intracranial abnormality  Workstation performed: [id number]         XR chest 2 views   Final Result by May Jackman MD (07/25 7110)      Stable cardiomegaly  No acute cardiopulmonary disease              Workstation performed: [id number]         MRI Inpatient Order    (Results Pending)              Procedures  ECG 12 Lead Documentation Only  Date/Time: 7/25/2019 1:45 PM  Performed by: Lady Pallavi DO  Authorized by: Lady Pallavi DO     Indications / Diagnosis:  Lightheadedness  ECG reviewed by me, the ED Provider: yes    Patient location:  ED  Previous ECG:     Previous ECG:  Compared to current    Comparison ECG info:  4-18    Similarity:  Changes noted  Interpretation:     Interpretation: abnormal    Rate:     ECG rate:  81    ECG rate assessment: normal    Rhythm:     Rhythm: atrial fibrillation    Ectopy:     Ectopy: none    QRS:     QRS axis:  Left    QRS intervals:  Normal  Conduction:     Conduction: normal    ST segments:     ST segments:  Normal  T waves:     T waves: normal             ED Course                               MDM  Number of Diagnoses or Management Options  Atrial fibrillation ([address] ): new and requires workup  Stroke-like symptoms: new and requires workup  Symptomatic anemia: new and requires workup     Amount and/or Complexity of Data Reviewed  Clinical lab tests: ordered and reviewed  Tests in the radiology section of CPT®: ordered and reviewed  Obtain history from someone other than the patient: yes  Discuss the patient with other providers: yes    Patient Progress  Patient progress: improved      Disposition  Final diagnoses:   Symptomatic anemia   Atrial fibrillation ([facility] [address] )   Stroke-like symptoms     Time reflects when diagnosis was documented in both MDM as applicable and the Disposition within this note     Time User Action Codes Description Comment    7/25/2019  3:53 PM Abdi Corn Add [D64 9] Symptomatic anemia     7/25/2019  3:54 PM Enid Corn Add [I48 91] Atrial fibrillation ([facility] [address] )     7/25/2019  4:22 PM Enid Dallas Add [R29 90] Stroke-like symptoms     7/25/2019  5:16 PM Siobhan Antunez Add [I63 9] Cerebrovascular accident (CVA), unspecified mechanism [facility])       ED Disposition     ED Disposition Condition Date/Time Comment    Admit Stable Thu Jul 25, 2019  4:21 PM Case was discussed with Caesar Hernández and the patient's admission status was agreed to be Admission Status: inpatient status to the service of Dr Camilo Pacheco           Follow-up Information    None         Current Discharge Medication List      CONTINUE these medications which have NOT CHANGED    Details   aspirin 81 mg chewable tablet Chew 1 tablet (81 mg total) daily  Qty: 30 tablet, Refills: 0    Associated Diagnoses: CVA (cerebral vascular accident) ([facility] [address] )      atorvastatin (LIPITOR) 40 mg tablet Take 1 tablet (40 mg total) by mouth every evening  Qty: 30 tablet, Refills: 0    Associated Diagnoses: CVA (cerebral vascular accident) ([facility] [address] )      clopidogrel (PLAVIX) 75 mg tablet Take 1 tablet (75 mg total) by mouth daily  Qty: 30 tablet, Refills: 0    Associated Diagnoses: CVA (cerebral vascular accident) ([facility] [address] )      hydrochlorothiazide (HYDRODIURIL) 25 mg tablet Take 12 5 mg by mouth daily        levothyroxine 75 mcg tablet Take 1 tablet (75 mcg total) by mouth daily  Qty: 30 tablet, Refills: 0    Associated Diagnoses: Acquired hypothyroidism      !! lisinopril (ZESTRIL) 10 mg tablet Take 10 mg by mouth daily at bedtime      !! lisinopril (ZESTRIL) 20 mg tablet Take 20 mg by mouth daily         ! ! - Potential duplicate medications found  Please discuss with provider  No discharge procedures on file      ED Provider  Electronically Signed by           Clarisa Vila DO  07/25/19 4326

## 2019-07-25 NOTE — H&P
H&P- Loni Mayo 12/16/1930, 80 y o  female MRN: [id number]    Unit/Bed#: ED 10-01 Encounter: [id number]    Primary Care Provider: Makenzie Mei   Date and time admitted to hospital: 7/25/2019  1:51 PM        Anemia  Assessment & Plan  -Hb down 2g from 15 months ago  -Heme NEG  -initiate work up with Fe panel for microcytic anemia      Acquired hypothyroidism  Assessment & Plan  -cont Synthroid  -check TSH    Essential hypertension  Assessment & Plan  -cont Lisinopril/HCTZ    CVA (cerebral vascular accident) ([facility] [address] )  Assessment & Plan  -pt with h/o CVA  -see above    * Lightheadedness  Assessment & Plan  -patient reports she had a few minutes of lightheadedness earlier this morning  It resolved and has not recurred   -denies positional lightheadedness  -patient reports systolic blood pressure just after the episode was 90, this event could have been due to orthostatic hypotension  -the ER attending discussed the case with Neurology and Neurology recommended admission under the stroke pathway  -CT Brain: No acute intracranial abnormality   -check MRI brain  -patient already on aspirin/Plavix/statin, continue  -monitor on tele  -check TSH        VTE Prophylaxis: Enoxaparin (Lovenox)   Code Status: FULL  POLST: POLST form is on file already (pre-hospital)  Discussion with family: at bedside in ER    Anticipated Length of Stay:  Patient will be admitted on an Inpatient basis with an anticipated length of stay of  > 2 midnights  Justification for Hospital Stay: lightheadedness    Total Time for Visit, including Counseling / Coordination of Care: 45 minutes  Greater than 50% of this total time spent on direct patient counseling and coordination of care  Chief Complaint:   Lightheadedness    History of Present Illness:    Loni Mayo is a 80 y o  female who presents with lightheadedness  The patient had an episode this morning of about 2-3 minutes of lightheadedness    She does not report any other episodes of lightheadedness today and does not report positional lightheadedness  Around her episode of lightheadedness she did check her blood pressure systolic blood pressure was around 90  Patient's family at bedside reports that yesterday her blood pressure was high    Patient denies any other complaints  Patient did see her primary care physician recently who ordered lab work  Her hemoglobin was 2 g lower than the previous lab draw and he was concerned she was having a GI bleed  The patient was heme-negative in the ER  Patient denies chest pain, shortness of breath, palpitations, cough, nausea, vomiting, diarrhea, abdominal pain, fevers, chills, night sweats, headache, visual disturbances, neck stiffness, new focal neurologic deficit, rash, dysuria, heat or cold intolerance, significant weight gain or loss  Review of Systems:    Review of Systems   All other systems reviewed and are negative  Past Medical and Surgical History:     Past Medical History:   Diagnosis Date    Disease of thyroid gland     Hyperlipidemia     Hypertension     Stroke [facility])        Past Surgical History:   Procedure Laterality Date    EYE SURGERY      b/l cataracts    ROTATOR CUFF REPAIR         Meds/Allergies:    Prior to Admission medications    Medication Sig Start Date End Date Taking?  Authorizing Provider   aspirin 81 mg chewable tablet Chew 1 tablet (81 mg total) daily 4/18/18  Yes Joel Stinson MD   atorvastatin (LIPITOR) 40 mg tablet Take 1 tablet (40 mg total) by mouth every evening 4/18/18  Yes Joel Stinson MD   clopidogrel (PLAVIX) 75 mg tablet Take 1 tablet (75 mg total) by mouth daily 4/18/18  Yes Joel Stinson MD   hydrochlorothiazide (HYDRODIURIL) 25 mg tablet Take 12 5 mg by mouth daily     Yes Historical Provider, MD   levothyroxine 75 mcg tablet Take 1 tablet (75 mcg total) by mouth daily 4/18/18  Yes Joel Stinson MD   lisinopril (ZESTRIL) 10 mg tablet Take 10 mg by mouth daily at bedtime Yes Historical Provider, MD   lisinopril (ZESTRIL) 20 mg tablet Take 20 mg by mouth daily     Yes Historical Provider, MD     I have reviewed home medications with a medical source (PCP, Pharmacy, other)  Allergies: No Known Allergies    Social History:     Marital Status: Single   Substance Use History:   Social History     Substance and Sexual Activity   Alcohol Use No     Social History     Tobacco Use   Smoking Status Never Smoker   Smokeless Tobacco Never Used     Social History     Substance and Sexual Activity   Drug Use No       Family History:    non-contributory    Physical Exam:     Vitals:   Blood Pressure: 162/92 (07/25/19 1718)  Pulse: 77 (07/25/19 1718)  Temperature: (!) 97 °F (36 1 °C) (07/25/19 1428)  Temp Source: Tympanic (07/25/19 1428)  Respirations: 18 (07/25/19 1718)  SpO2: 100 % (07/25/19 1718)    Physical Exam    Gen: NAD, AAOx3, well developed, well nourished  Eyes: EOMI, PERRLA, no scleral icterus  ENMT:  Oropharynx clear of erythema or exudates, no nasal discharge, no otic discharge, moist mucous membranes  Neck:  Supple  Lymph:  No anterior or posterior cervical or supraclavicular lymphadenopathy  Cardiovascular:  Regular rate and rhythm, normal S1-S2, no murmurs, rubs, or gallops  Lungs:  Clear to auscultation bilaterally, no wheezes, or rales, or rhonchi  Abdomen:  Positive bowel sounds, soft, nontender, nondistended, no palpable organomegaly   Skin:  Intact, no obvious lesions or rashes, no edema  Neuro: Cranial nerves 2-12 are intact, non-focal, 5/5 strength in all 4 extremities      Additional Data:     Lab Results: I have personally reviewed pertinent reports        Results from last 7 days   Lab Units 07/25/19  1351   WBC Thousand/uL 5 89   HEMOGLOBIN g/dL 10 2*   HEMATOCRIT % 33 7*   PLATELETS Thousands/uL 309   NEUTROS PCT % 67   LYMPHS PCT % 21   MONOS PCT % 9   EOS PCT % 2     Results from last 7 days   Lab Units 07/25/19  1351   SODIUM mmol/L 134*   POTASSIUM mmol/L 3 9 CHLORIDE mmol/L 100   CO2 mmol/L 28   BUN mg/dL 11   CREATININE mg/dL 0 97   ANION GAP mmol/L 6   CALCIUM mg/dL 9 5   ALBUMIN g/dL 3 8   TOTAL BILIRUBIN mg/dL 0 80   ALK PHOS U/L 119*   ALT U/L 16   AST U/L 18   GLUCOSE RANDOM mg/dL 85                       Imaging: I have personally reviewed pertinent reports  CT head without contrast   Final Result by Yaquelin Morales MD (07/25 0109)      No acute intracranial abnormality  Workstation performed: [id number]         XR chest 2 views   Final Result by Yaquelin Morales MD (07/25 1951)      Stable cardiomegaly  No acute cardiopulmonary disease  Workstation performed: [id number]             [facility] Epic Records Reviewed: Yes     ** Please Note: This note has been constructed using a voice recognition system   **

## 2019-07-26 ENCOUNTER — APPOINTMENT (INPATIENT)
Dept: MRI IMAGING | Facility: HOSPITAL | Age: 84
DRG: 812 | End: 2019-07-26
Payer: COMMERCIAL

## 2019-07-26 VITALS
HEIGHT: 62 IN | RESPIRATION RATE: 14 BRPM | BODY MASS INDEX: 26.17 KG/M2 | SYSTOLIC BLOOD PRESSURE: 114 MMHG | WEIGHT: 142.2 LBS | TEMPERATURE: 98 F | OXYGEN SATURATION: 92 % | DIASTOLIC BLOOD PRESSURE: 72 MMHG | HEART RATE: 90 BPM

## 2019-07-26 PROBLEM — I48.92 ATRIAL FLUTTER (HCC): Status: ACTIVE | Noted: 2019-07-26

## 2019-07-26 PROBLEM — I48.0 PAROXYSMAL ATRIAL FIBRILLATION (HCC): Status: ACTIVE | Noted: 2019-07-26

## 2019-07-26 PROBLEM — Z86.73 HISTORY OF CVA (CEREBROVASCULAR ACCIDENT) WITHOUT RESIDUAL DEFICITS: Status: ACTIVE | Noted: 2018-04-16

## 2019-07-26 PROBLEM — D50.8 IRON DEFICIENCY ANEMIA SECONDARY TO INADEQUATE DIETARY IRON INTAKE: Status: ACTIVE | Noted: 2019-07-25

## 2019-07-26 LAB
ATRIAL RATE: 202 BPM
ATRIAL RATE: 416 BPM
CHOLEST SERPL-MCNC: 118 MG/DL (ref 50–200)
ERYTHROCYTE [DISTWIDTH] IN BLOOD BY AUTOMATED COUNT: 18.1 % (ref 11.6–15.1)
EST. AVERAGE GLUCOSE BLD GHB EST-MCNC: 123 MG/DL
HBA1C MFR BLD: 5.9 % (ref 4.2–6.3)
HCT VFR BLD AUTO: 30.6 % (ref 34.8–46.1)
HDLC SERPL-MCNC: 70 MG/DL (ref 40–60)
HGB BLD-MCNC: 9.4 G/DL (ref 11.5–15.4)
LDLC SERPL CALC-MCNC: 41 MG/DL (ref 0–100)
MCH RBC QN AUTO: 22.8 PG (ref 26.8–34.3)
MCHC RBC AUTO-ENTMCNC: 30.7 G/DL (ref 31.4–37.4)
MCV RBC AUTO: 74 FL (ref 82–98)
PLATELET # BLD AUTO: 279 THOUSANDS/UL (ref 149–390)
PMV BLD AUTO: 9.5 FL (ref 8.9–12.7)
QRS AXIS: -35 DEGREES
QRS AXIS: -38 DEGREES
QRSD INTERVAL: 106 MS
QRSD INTERVAL: 106 MS
QT INTERVAL: 398 MS
QT INTERVAL: 436 MS
QTC INTERVAL: 462 MS
QTC INTERVAL: 463 MS
RBC # BLD AUTO: 4.13 MILLION/UL (ref 3.81–5.12)
T WAVE AXIS: -32 DEGREES
T WAVE AXIS: 118 DEGREES
TRIGL SERPL-MCNC: 36 MG/DL
VENTRICULAR RATE: 68 BPM
VENTRICULAR RATE: 81 BPM
WBC # BLD AUTO: 4.88 THOUSAND/UL (ref 4.31–10.16)

## 2019-07-26 PROCEDURE — G8988 SELF CARE GOAL STATUS: HCPCS

## 2019-07-26 PROCEDURE — 97163 PT EVAL HIGH COMPLEX 45 MIN: CPT

## 2019-07-26 PROCEDURE — G8987 SELF CARE CURRENT STATUS: HCPCS

## 2019-07-26 PROCEDURE — 93005 ELECTROCARDIOGRAM TRACING: CPT

## 2019-07-26 PROCEDURE — 83036 HEMOGLOBIN GLYCOSYLATED A1C: CPT | Performed by: HOSPITALIST

## 2019-07-26 PROCEDURE — 99239 HOSP IP/OBS DSCHRG MGMT >30: CPT | Performed by: HOSPITALIST

## 2019-07-26 PROCEDURE — G8980 MOBILITY D/C STATUS: HCPCS

## 2019-07-26 PROCEDURE — 93010 ELECTROCARDIOGRAM REPORT: CPT | Performed by: INTERNAL MEDICINE

## 2019-07-26 PROCEDURE — 99222 1ST HOSP IP/OBS MODERATE 55: CPT | Performed by: INTERNAL MEDICINE

## 2019-07-26 PROCEDURE — G8978 MOBILITY CURRENT STATUS: HCPCS

## 2019-07-26 PROCEDURE — 80061 LIPID PANEL: CPT | Performed by: HOSPITALIST

## 2019-07-26 PROCEDURE — 85027 COMPLETE CBC AUTOMATED: CPT | Performed by: HOSPITALIST

## 2019-07-26 PROCEDURE — G8979 MOBILITY GOAL STATUS: HCPCS

## 2019-07-26 PROCEDURE — 70551 MRI BRAIN STEM W/O DYE: CPT

## 2019-07-26 PROCEDURE — 99222 1ST HOSP IP/OBS MODERATE 55: CPT | Performed by: PSYCHIATRY & NEUROLOGY

## 2019-07-26 PROCEDURE — 97166 OT EVAL MOD COMPLEX 45 MIN: CPT

## 2019-07-26 RX ORDER — FERROUS SULFATE TAB EC 324 MG (65 MG FE EQUIVALENT) 324 (65 FE) MG
324 TABLET DELAYED RESPONSE ORAL
Qty: 60 TABLET | Refills: 0 | Status: SHIPPED | OUTPATIENT
Start: 2019-07-26

## 2019-07-26 RX ADMIN — LISINOPRIL 20 MG: 20 TABLET ORAL at 10:04

## 2019-07-26 RX ADMIN — APIXABAN 5 MG: 5 TABLET, FILM COATED ORAL at 10:03

## 2019-07-26 RX ADMIN — ASPIRIN 81 MG 81 MG: 81 TABLET ORAL at 10:03

## 2019-07-26 RX ADMIN — LEVOTHYROXINE SODIUM 75 MCG: 75 TABLET ORAL at 06:17

## 2019-07-26 RX ADMIN — IRON SUCROSE 200 MG: 20 INJECTION, SOLUTION INTRAVENOUS at 11:20

## 2019-07-26 RX ADMIN — HYDROCHLOROTHIAZIDE 12.5 MG: 12.5 TABLET ORAL at 10:04

## 2019-07-26 NOTE — CONSULTS
[facility] 12/16/1930, 80 y o  female   MRN: [id number]/ZNQ#: [address] 203-01 Encounter: [id number]      Inpatient consult to Neurology  Consult performed by: IRASEMA Samaniego  Consult ordered by: Melchor Giordano MD      Reason for Consult / Principal Problem:  Lightheadedness questionable stroke  Hx and PE limited by:  None  Review of previous medical records was completed  Family, was not present at the bedside for history and examination, the patient was deemed to be reliable independent historian  History of R anterior MCA CVA without residual deficits  Assessment & Plan  Her previous stroke approximately 1 year ago, does not appear to have left her with any significant residual deficit  The patient had returned to driving and exercising  She continues to ambulate without a cane on a general daily basis  The event of yesterday prompting her admission was as noted above as a lightheaded episode which appears to have been caused by atrial flutter possibly in combination with a low blood count  She reports she had been compliant with her aspirin and Plavix  At this time because of the atrial flutter she is now being switched to Eliquis  She does have rather extensive small vessel disease appreciated both the time of her stroke as well as with continued progression at this time  I have asked this patient to follow up with Neurology in the office  He did not see us after her stroke  She continues to be at high risk is for cerebral vascular events  She will see 1 of our stroke team members at our [facility] office  Atrial flutter ([address] )  Assessment & Plan  Her a flutter which was found overnight last night on telemetry is probably the trigger for her admission  At this time she has been switched appropriately to Eliquis  She will likely be following up with the Cardiology  * Lightheadedness  Assessment & Plan  Has now resolved  Her blood pressures are stable  She reports initially the time this event her a blood pressures at home were in the 90 systolic region  HPI: Britta Ortiz is a right handed  80 y o  female who  suffered her 1st stroke that was documented approximately a year ago in April of 2018  At that time she sustained a moderate-sized right anterior MCA infarct and although her family and healthcare workers appreciated a difference in her personality she apparently has returned to her premorbid baseline and has been doing well without any residual deficit  She was started on aspirin and Plavix at that time we never did see her in the office she was somehow lost to follow-up despite attempts that out reach  By her report she has been doing well she is compliant with her meds  She reports that she lives with her granddaughter and her young adolescent 2 children  She reports that yesterday the kids had friends over the house  She reports that at 1 point they seemed to be carrying on  She yelled at them to settle down which she reports is uncharacteristic in even the children noticed it and it was at that point that she realized she did not feel well  She reports that although she did not have any lateralizing symptoms she felt somewhat lightheaded  She reports that she checked her blood pressure and she was only 94 so systolic  She reports that she had been 150/94 the evening before  She alerted her daughter to the fact that she was not feeling well and she subsequently presented here to the emergency room approximately 2:00 p m  Sukh Rodriguez She was admitted for a stroke workup overnight and her monitor during the night noted atrial flutter  Also on presentation yesterday she was noted to have a drop in her hemoglobin and hematocrit compared with her priors several months ago on last year  The patient is examined now in the bedside  She is awake conversant able to service arm historian  She reports that currently she feels well        ROS: 12 system cued query:  She denies any current chest pain or shortness of breath she feels no palpitations she does not endorse any flutter or other cardiac sensations  She has no ocular or visual disturbances or changes no headache at this time  She does not endorse any facial or bulbar symptoms  She denies any lateralizing limb weakness or dysesthesias  No bladder or bowel complaints  No other constitutional is symptoms  Historical Information     Past Medical History:   Diagnosis Date    Disease of thyroid gland     Hyperlipidemia     Hypertension     Stroke [facility])      Past Surgical History:   Procedure Laterality Date    EYE SURGERY      b/l cataracts    ROTATOR CUFF REPAIR         Social History :  She is  she lives with her adult daughter and her to adolescent children  She temporarily has not been driving but has otherwise remained active  She is a nonsmoker no alcohol no recreational is  Family History: History reviewed  No pertinent family history  No Known Allergies  Meds:all current active meds have been reviewed and And the patient reports good compliance with her meds  Scheduled Meds:  Current Facility-Administered Medications:  apixaban 5 mg Oral BID   aspirin 81 mg Oral Daily   atorvastatin 40 mg Oral QPM   hydrochlorothiazide 12 5 mg Oral Daily   iron sucrose 200 mg Intravenous Daily   levothyroxine 75 mcg Oral Early Morning   lisinopril 10 mg Oral HS   lisinopril 20 mg Oral Daily     PRN Meds:       Physical Exam:   Objective   Vitals:Blood pressure 114/72, pulse 90, temperature 98 °F (36 7 °C), temperature source Oral, resp  rate 14, height 5' 2 4" (1 585 m), weight 64 5 kg (142 lb 3 2 oz), SpO2 92 %  ,Body mass index is 25 68 kg/m²  Patient was examined in bed she was gaited and returned to the bed at her request for a nap  She reports she feels somewhat fatigued    General: alert, petite woman appears stated age and cooperative  Head: Normocephalic, without obvious abnormality, atraumatic  Oral exam: lips, mucosa, and tongue moist;   Neck: no carotid bruit,   Lungs: clear to auscultation ant  bilaterally  Heart: irregular rhythm appreciated rate 79-82 on my account, S1, S2 normal, no murmur appreciated,   Abdomen: soft, +BS    Extremities: atraumatic, no cyanosis or edema    Neurologic:   Mental status: Alert, oriented, thought content appropriate, there were no deficits on cognitive screening  CN Exam: SUZANNE, EOM's I, VF full, Gaze conjugate No sensory or motor lateralizations (No PP on face), Hearing I B, CNIX-XII I B  Motor:  Symmetrical well preserved power given her age x 4 limbs  Sensory:  Grossly intact  X 4 limbs, 4 mod inc lt, temp, vibratory appreciation was present in the great toe on the right transiently was not appreciated in the great toe on the left however was appreciated at the forefoot  (not PP tested)  Cerebellar:  Controlled sway without past pointing or drift from [employer] position, again she did well given her age, no ataxia w maneuvers, Fine motor & finger taps smooth and symmetrical, age appropriate, WNL  DTR's: Age appropriate, WNL; Plantars: downgoing B  Gait: Fluid smooth, no LOB w cadance change  She did well with forced gaits including heel and toe walk she was also able to reverse her straight gait cautiously but for distance of greater than 12 feet  Lab Results:   I have personally reviewed pertinent reports    , CBC:   Results from last 7 days   Lab Units 07/26/19  0446 07/25/19  1351   WBC Thousand/uL 4 88 5 89   RBC Million/uL 4 13 4 47   HEMOGLOBIN g/dL 9 4* 10 2*   HEMATOCRIT % 30 6* 33 7*   MCV fL 74* 75*   PLATELETS Thousands/uL 279 309   , BMP/CMP:   Results from last 7 days   Lab Units 07/25/19  1351   SODIUM mmol/L 134*   POTASSIUM mmol/L 3 9   CHLORIDE mmol/L 100   CO2 mmol/L 28   BUN mg/dL 11   CREATININE mg/dL 0 97   CALCIUM mg/dL 9 5   AST U/L 18   ALT U/L 16   ALK PHOS U/L 119*   EGFR ml/min/1 73sq m 52   , Vitamin B12:   , HgBA1C:   Results from last 7 days   Lab Units 07/26/19  0447   HEMOGLOBIN A1C % 5 9   , TSH:   Results from last 7 days   Lab Units 07/25/19  1351   TSH 3RD GENERATON uIU/mL 0 967   , Coagulation:   , Lipid Profile:   Results from last 7 days   Lab Units 07/26/19  0447   HDL mg/dL 70*   LDL CALC mg/dL 41   TRIGLYCERIDES mg/dL 36        Imaging Studies: I have personally reviewed pertinent films in PACS and Have reviewed her films from 2018 both independently as well as with the patient at the bedside  We discussed her stroke her chronic small vessel disease the implications of her new AFib and a flutter  EEG, Echo, Pathology, and Other Studies: Her echocardiogram last year in April demonstrated an ejection fraction is 65% she does have a moderate dilation of the left atrium and there is some valvular regurg appreciated  There is no echo ordered for this some events so far  Counseling / Coordination of Care  Total time spent today Greater than 45 minutes  Greater than 50% of total time was spent with the patient and / or family counseling and / or coordination of care  A description of the counseling / coordination of care: All of the above was discussed with the patient at the bedside  Films as noted above were reviewed with the patient so that she would have a better understanding of the impact of cerebral vascular disease  She had several questions we discussed heart rhythms and the impact and implications of heart irregularities particularly AFib and flutter  We also discussed changing her medications and what that means in terms of anti-platelet and anticoagulant  Dictation voice to text software has been used in the creation of this document  Please consider this in light of any contextual or grammatical errors

## 2019-07-26 NOTE — CONSULTS
Consultation - Cardiology   Deion Gates 80 y o  female MRN: [id number]  Unit/Bed#: [address] [place] [address] 203-01 Encounter: [id number]    Assessment/Plan     Assessment:    New onset atrial fibrillation      Plan:    She is currently rate controlled and asymptomatic  She is now on anticoagulation  Anemia workup per primary service and will need to watch her hemoglobin very carefully while on anticoagulation  History of Present Illness   Physician Requesting Consult: Lila Bolivar MD  Reason for Consult / Principal Problem: Atrial Fibrillation  HPI: Deion Gates is a 80y o  year old female who presents with a history of CVA  She presented with symptoms of fatigue and lightheadedness  Her PCP was also concerned about her anemia and she was admitted  During the admission she was found to have atrial fibrillation which is a new diagnosis for her  She currently feels better  She has no chest pain, no dyspnea and no palpitations  Workup for anemia by primary team and she is currently receiving IV iron  Inpatient consult to Cardiology  Consult performed by: Lavelle Perez MD  Consult ordered by: Lila Bolivar MD          Review of Systems   Constitutional: Positive for fatigue  HENT: Negative  Eyes: Negative  Respiratory: Negative  Cardiovascular: Negative  Gastrointestinal: Negative  Endocrine: Negative  Genitourinary: Negative  Musculoskeletal: Negative  Skin: Negative  Allergic/Immunologic: Negative  Neurological: Positive for dizziness  Hematological: Negative  Psychiatric/Behavioral: Negative          Historical Information   Past Medical History:   Diagnosis Date    Disease of thyroid gland     Hyperlipidemia     Hypertension     Stroke [facility])      Past Surgical History:   Procedure Laterality Date    EYE SURGERY      b/l cataracts    ROTATOR CUFF REPAIR       Social History     Substance and Sexual Activity   Alcohol Use Yes    Alcohol/week: 1 0 standard drinks  Types: 1 Glasses of wine per week    Comment: a glass at night     Social History     Substance and Sexual Activity   Drug Use No     Social History     Tobacco Use   Smoking Status Never Smoker   Smokeless Tobacco Never Used     Family History: History reviewed  No pertinent family history  Meds/Allergies   current meds:   Current Facility-Administered Medications   Medication Dose Route Frequency    apixaban (ELIQUIS) tablet 5 mg  5 mg Oral BID    atorvastatin (LIPITOR) tablet 40 mg  40 mg Oral QPM    hydrochlorothiazide (HYDRODIURIL) tablet 12 5 mg  12 5 mg Oral Daily    iron sucrose (VENOFER) 200 mg in sodium chloride 0 9 % 100 mL IVPB  200 mg Intravenous Daily    levothyroxine tablet 75 mcg  75 mcg Oral Early Morning    lisinopril (ZESTRIL) tablet 10 mg  10 mg Oral HS    lisinopril (ZESTRIL) tablet 20 mg  20 mg Oral Daily     No Known Allergies    Objective   Vitals: Blood pressure 114/72, pulse 90, temperature 98 °F (36 7 °C), temperature source Oral, resp  rate 14, height 5' 2 4" (1 585 m), weight 64 5 kg (142 lb 3 2 oz), SpO2 92 %  Orthostatic Blood Pressures      Most Recent Value   Blood Pressure  114/72 filed at 07/26/2019 0500   Patient Position - Orthostatic VS  Lying filed at 07/26/2019 0500          No intake or output data in the 24 hours ending 07/26/19 1219    Invasive Devices     Peripheral Intravenous Line            Peripheral IV 07/25/19 Left Antecubital less than 1 day                Physical Exam   Constitutional: No distress  HENT:   Mouth/Throat: No oropharyngeal exudate  Eyes: No scleral icterus  Neck: No JVD present  Cardiovascular: Normal rate and regular rhythm  Pulmonary/Chest: Effort normal and breath sounds normal  No stridor  No respiratory distress  She has no wheezes  Abdominal: Soft  Bowel sounds are normal  She exhibits no distension  There is no tenderness  There is no guarding  Musculoskeletal: She exhibits no edema  Neurological: She is alert  Skin: Skin is warm and dry  She is not diaphoretic  Psychiatric: She has a normal mood and affect  Her behavior is normal        Lab Results:   I have personally reviewed pertinent lab results  CBC with diff:   Results from last 7 days   Lab Units 07/26/19  0446   WBC Thousand/uL 4 88   RBC Million/uL 4 13   HEMOGLOBIN g/dL 9 4*   HEMATOCRIT % 30 6*   MCV fL 74*   MCH pg 22 8*   MCHC g/dL 30 7*   RDW % 18 1*   MPV fL 9 5   PLATELETS Thousands/uL 279     CMP:   Results from last 7 days   Lab Units 07/25/19  1351   SODIUM mmol/L 134*   POTASSIUM mmol/L 3 9   CHLORIDE mmol/L 100   CO2 mmol/L 28   BUN mg/dL 11   CREATININE mg/dL 0 97   CALCIUM mg/dL 9 5   AST U/L 18   ALT U/L 16   ALK PHOS U/L 119*   EGFR ml/min/1 73sq m 52     Troponin:   0   Lab Value Date/Time    TROPONINI <0 02 07/25/2019 1351    TROPONINI <0 02 04/16/2018 1145     BNP:   Results from last 7 days   Lab Units 07/25/19  1351   POTASSIUM mmol/L 3 9   CHLORIDE mmol/L 100   CO2 mmol/L 28   BUN mg/dL 11   CREATININE mg/dL 0 97   CALCIUM mg/dL 9 5   EGFR ml/min/1 73sq m 52     Coags:     Imaging: I have personally reviewed pertinent films in PACS  EKG: Atrial Fibrillation    Code Status: Level 1 - Full Code  Advance Directive and Living Will:      Power of :    POLST:      Counseling / Coordination of Care  Total floor / unit time spent today 45 minutes  Greater than 50% of total time was spent with the patient and / or family counseling and / or coordination of care  A description of the counseling / coordination of care

## 2019-07-26 NOTE — ASSESSMENT & PLAN NOTE
Her previous stroke approximately 1 year ago, does not appear to have left her with any significant residual deficit  The patient had returned to driving and exercising  She continues to ambulate without a cane on a general daily basis  The event of yesterday prompting her admission was as noted above as a lightheaded episode which appears to have been caused by atrial flutter possibly in combination with a low blood count  She reports she had been compliant with her aspirin and Plavix  At this time because of the atrial flutter she is now being switched to Eliquis  She does have rather extensive small vessel disease appreciated both the time of her stroke as well as with continued progression at this time  I have asked this patient to follow up with Neurology in the office  He did not see us after her stroke  She continues to be at high risk is for cerebral vascular events  She will see 1 of our stroke team members at our [facility] office

## 2019-07-26 NOTE — ASSESSMENT & PLAN NOTE
-patient reports she had a few minutes of lightheadedness earlier the morning of admission  It resolved and has not recurred   -denies positional lightheadedness  -patient reports systolic blood pressure just after the episode was 90, this event could have been due to orthostatic hypotension  -the ER attending discussed the case with Neurology and Neurology recommended admission under the stroke pathway  -CT Brain: No acute intracranial abnormality  -MRI brain: Evolution of right lateral frontal lobe infarction which had been acute in April 2018   No or new intracranial ischemia   No intracranial hemorrhage  Advanced microangiopathic changes again noted    -TSH nl

## 2019-07-26 NOTE — UTILIZATION REVIEW
Initial Clinical Review    Admission: Date/Time/Statement: 7/25/19 @ 1623     Orders Placed This Encounter   Procedures    Inpatient Admission     Standing Status:   Standing     Number of Occurrences:   1     Order Specific Question:   Admitting Physician     Answer:   Ochoa Sykes [[id number]]     Order Specific Question:   Level of Care     Answer:   Med Surg [[id number]]     Order Specific Question:   Estimated length of stay     Answer:   More than 2 Midnights     Order Specific Question:   Certification     Answer:   I certify that inpatient services are medically necessary for this patient for a duration of greater than two midnights  See H&P and MD Progress Notes for additional information about the patient's course of treatment  ED Arrival Information     Expected Arrival Acuity Means of Arrival Escorted By Service Admission Type    - 7/25/2019 13:22 Urgent Walk-In Family Member General Medicine Urgent    Arrival Complaint    fatigue, lightheadded        Chief Complaint   Patient presents with    Dizziness     pt c/o feeling lightheaded  BP at home was 90/65, pt has a drop in her blood counts, PCP worried about possible GI bleed  Assessment/Plan: 87 YO FEMALE PRESENTED TO ER AS INPATIENT ADMISSION FOR LIGHTHEADEDNESS FOR ~ 1-2 MINUTES  PATIENT CHECKED HER BP AND SYSTOLIC 90  OUTPATIENT SHOWED HEMOGLOBIN HAD DROPPED 2 GRAMS FROM 15 MTHS AGO AND PCP CONCERN OF POSSIBLE GI BLEED  PLAN     * Lightheadedness  Assessment & Plan  -patient reports she had a few minutes of lightheadedness earlier this morning  It resolved and has not recurred   -denies positional lightheadedness  -patient reports systolic blood pressure just after the episode was 90, this event could have been due to orthostatic hypotension  -the ER attending discussed the case with Neurology and Neurology recommended admission under the stroke pathway    -CT Brain: No acute intracranial abnormality   -check MRI brain  -patient already on aspirin/Plavix/statin, continue  -monitor on tele  -check TSH  ED Triage Vitals   Temperature Pulse Respirations Blood Pressure SpO2   07/25/19 1428 07/25/19 1341 07/25/19 1341 07/25/19 1341 07/25/19 1341   (!) 97 °F (36 1 °C) 71 18 143/73 99 %      Temp Source Heart Rate Source Patient Position - Orthostatic VS BP Location FiO2 (%)   07/25/19 1428 07/25/19 1515 07/25/19 1450 07/25/19 1450 --   Tympanic Monitor Lying - Orthostatic VS Right arm       Pain Score       07/25/19 1341       No Pain        Wt Readings from Last 1 Encounters:   07/25/19 64 5 kg (142 lb 3 2 oz)     Additional Vital Signs:   07/26/19 0300  98 °F (36 7 °C)  82  12  124/69  94 %  None (Room air)  Lying   07/26/19 0100  98 °F (36 7 °C)  80  12  121/59  92 %  None (Room air)  Lying   07/25/19 2300  98 °F (36 7 °C)  77  16  126/60  95 %  None (Room air)  Lying   07/25/19 2100  98 °F (36 7 °C)  80  16  124/74    None (Room air)     07/25/19 2024  97 8 °F (36 6 °C)  76  18  123/78  94 %    Lying   07/25/19 2000            None (Room air)     07/25/19 1805  97 6 °F (36 4 °C)  75  18  159/76  99 %  None (Room air)  Lying   07/25/19 1718    77  18  162/92  100 %  None (Room air)  Lying   07/25/19 1515    90  18  150/90  100 %  None (Room air)  Sitting   07/25/19 1455        153/72      Standing - Orthostatic VS   07/25/19 1452        164/73      Sitting - Orthostatic VS   07/25/19 1450        160/68      Lying - Orthostatic VS       Pertinent Labs/Diagnostic Test Results:   Results from last 7 days   Lab Units 07/26/19  0446 07/25/19  1351   WBC Thousand/uL 4 88 5 89   HEMOGLOBIN g/dL 9 4* 10 2*   HEMATOCRIT % 30 6* 33 7*   PLATELETS Thousands/uL 279 309   NEUTROS ABS Thousands/µL  --  3 97         Results from last 7 days   Lab Units 07/25/19  1351   SODIUM mmol/L 134*   POTASSIUM mmol/L 3 9   CHLORIDE mmol/L 100   CO2 mmol/L 28   ANION GAP mmol/L 6   BUN mg/dL 11   CREATININE mg/dL 0 97   EGFR ml/min/1 73sq m 52   CALCIUM mg/dL 9 5     Results from last 7 days   Lab Units 07/25/19  1351   AST U/L 18   ALT U/L 16   ALK PHOS U/L 119*   TOTAL PROTEIN g/dL 6 9   ALBUMIN g/dL 3 8   TOTAL BILIRUBIN mg/dL 0 80         Results from last 7 days   Lab Units 07/25/19  1351   GLUCOSE RANDOM mg/dL 85     Results from last 7 days   Lab Units 07/25/19  1351   TROPONIN I ng/mL <0 02     Results from last 7 days   Lab Units 07/25/19  1351   FERRITIN ng/mL 8     Results from last 7 days   Lab Units 07/25/19  1715   CLARITY UA  Clear   COLOR UA  Yellow   SPEC GRAV UA  1 010   PH UA  6 5   GLUCOSE UA mg/dl Negative   KETONES UA mg/dl Negative   BLOOD UA  Negative   PROTEIN UA mg/dl Negative   NITRITE UA  Negative   BILIRUBIN UA  Negative   UROBILINOGEN UA E U /dl 0 2   LEUKOCYTES UA  Negative     CXR 07-25-19  NAD  EKG 07-25-19  Previous ECG:     Previous ECG:  Compared to current    Comparison ECG info:  4-18    Similarity:  Changes noted  Interpretation:     Interpretation: abnormal    Rate:     ECG rate:  81    ECG rate assessment: normal    Rhythm:     Rhythm: atrial fibrillation    Ectopy:     Ectopy: none    QRS:     QRS axis:  Left    QRS intervals:  Normal  Conduction:     Conduction: normal    ST segments:     ST segments:  Normal  T waves:     T waves: normal       ED Treatment:   Medication Administration from 07/25/2019 1322 to 07/25/2019 1752       Date/Time Order Dose Route Action     07/25/2019 1635 aspirin chewable tablet 324 mg 324 mg Oral Given        Past Medical History:   Diagnosis Date    Disease of thyroid gland     Hyperlipidemia     Hypertension     Stroke [facility])      Present on Admission:   Lightheadedness   Essential hypertension   Acquired hypothyroidism   [facility]cerebral vascular accident) ([facility] [address] )   Iron deficiency anemia secondary to inadequate dietary iron intake      Admitting Diagnosis: Atrial fibrillation (HCC) [I48 91]  Fatigue [R53 83]  Lightheaded [R42]  Stroke-like symptoms [R29 90]  Symptomatic anemia [D64 9]  Cerebrovascular accident (CVA), unspecified mechanism ([facility] [address] ) [I63 9]  Age/Sex: 80 y o  female  Admission Orders:    Current Facility-Administered Medications:  apixaban 5 mg Oral BID   aspirin 81 mg Oral Daily   atorvastatin 40 mg Oral QPM   hydrochlorothiazide 12 5 mg Oral Daily   iron sucrose 200 mg Intravenous Daily   levothyroxine 75 mcg Oral Early Morning   lisinopril 10 mg Oral HS   lisinopril 20 mg Oral Daily       IP CONSULT TO NEUROLOGY  IP CONSULT TO CASE MANAGEMENT  IP CONSULT TO NUTRITION SERVICES  IP CONSULT TO CARDIOLOGY   TELEMETRY  PT/OT   VS Q 1 HR X 4  Q 2HR X 4 Q 4 HR X 72     Network Utilization Review Department  Phone: [phone]; Fax [phone]  [email or web address]  ATTENTION: Please call with any questions or concerns to [phone]  and carefully listen to the prompts so that you are directed to the right person  Send all requests for admission clinical reviews, approved or denied determinations and any other requests to fax [phone]   All voicemails are confidential

## 2019-07-26 NOTE — ASSESSMENT & PLAN NOTE
Has now resolved  Her blood pressures are stable  She reports initially the time this event her a blood pressures at home were in the 90 systolic region

## 2019-07-26 NOTE — PLAN OF CARE
Problem: SAFETY ADULT  Goal: Patient will remain free of falls  Description  INTERVENTIONS:  - Assess patient frequently for physical needs  -  Identify cognitive and physical deficits and behaviors that affect risk of falls    -  [place] fall precautions as indicated by assessment   - Educate patient/family on patient safety including physical limitations  - Instruct patient to call for assistance with activity based on assessment  - Modify environment to reduce risk of injury  - Consider OT/PT consult to assist with strengthening/mobility  7/26/2019 1601 by Stella Price RN  Outcome: Adequate for Discharge  7/26/2019 1148 by Stella Price RN  Outcome: Progressing  Goal: Maintain or return to baseline ADL function  Description  INTERVENTIONS:  -  Assess patient's ability to carry out ADLs; assess patient's baseline for ADL function and identify physical deficits which impact ability to perform ADLs (bathing, care of mouth/teeth, toileting, grooming, dressing, etc )  - Assess/evaluate cause of self-care deficits   - Assess range of motion  - Assess patient's mobility; develop plan if impaired  - Assess patient's need for assistive devices and provide as appropriate  - Encourage maximum independence but intervene and supervise when necessary  ¯ Involve family in performance of ADLs  ¯ Assess for home care needs following discharge   ¯ Request OT consult to assist with ADL evaluation and planning for discharge  ¯ Provide patient education as appropriate  7/26/2019 1601 by Stella Price RN  Outcome: Adequate for Discharge  7/26/2019 1148 by Stella Price RN  Outcome: Progressing  Goal: Maintain or return mobility status to optimal level  Description  INTERVENTIONS:  - Assess patient's baseline mobility status (ambulation, transfers, stairs, etc )    - Identify cognitive and physical deficits and behaviors that affect mobility  - Identify mobility aids required to assist with transfers and/or ambulation (gait belt, sit-to-stand, lift, walker, cane, etc )  - [place] fall precautions as indicated by assessment  - Record patient progress and toleration of activity level on Mobility SBAR; progress patient to next Phase/Stage  - Instruct patient to call for assistance with activity based on assessment  - Request Rehabilitation consult to assist with strengthening/weightbearing, etc   7/26/2019 1601 by Humberto Almanza RN  Outcome: Adequate for Discharge  7/26/2019 1148 by Humberto Almanza RN  Outcome: Progressing     Problem: DISCHARGE PLANNING  Goal: Discharge to home or other facility with appropriate resources  Description  INTERVENTIONS:  - Identify barriers to discharge w/patient and caregiver  - Arrange for needed discharge resources and transportation as appropriate  - Identify discharge learning needs (meds, wound care, etc )  - Arrange for interpretive services to assist at discharge as needed  - Refer to Case Management Department for coordinating discharge planning if the patient needs post-hospital services based on physician/advanced practitioner order or complex needs related to functional status, cognitive ability, or social support system  7/26/2019 1601 by Humberto Almanza RN  Outcome: Adequate for Discharge  7/26/2019 1148 by Humberto Almanza RN  Outcome: Progressing     Problem: Knowledge Deficit  Goal: Patient/family/caregiver demonstrates understanding of disease process, treatment plan, medications, and discharge instructions  Description  Complete learning assessment and assess knowledge base    Interventions:  - Provide teaching at level of understanding  - Provide teaching via preferred learning methods  7/26/2019 1601 by Humberto Almanza RN  Outcome: Adequate for Discharge  7/26/2019 1148 by Humberto Almanza RN  Outcome: Progressing     Problem: Neurological Deficit  Goal: Neurological status is stable or improving  Description  Interventions:  - Monitor and assess patient's level of consciousness, motor function, sensory function, and level of assistance needed for ADLs  - Monitor and report changes from baseline  Collaborate with interdisciplinary team to initiate plan and implement interventions as ordered  - Provide and maintain a safe environment  - Utilize seizure precautions  - Utilize fall precautions  - Utilize aspiration precautions  - Utilize bleeding precautions  7/26/2019 1601 by Adrian Escamilla RN  Outcome: Adequate for Discharge  7/26/2019 1148 by Adrian Escamilla RN  Outcome: Progressing     Problem: Activity Intolerance/Impaired Mobility  Goal: Mobility/activity is maintained at optimum level for patient  Description  Interventions:  - Assess and monitor patient  barriers to mobility and need for assistive/adaptive devices  - Assess patient's emotional response to limitations  - Collaborate with interdisciplinary team and initiate plans and interventions as ordered  - Encourage independent activity per ability   - Maintain proper body alignment  - Perform active/passive rom as tolerated/ordered  - Plan activities to conserve energy   - Turn patient  7/26/2019 1601 by Adrian Escamilla RN  Outcome: Adequate for Discharge  7/26/2019 1148 by Adrian Escamilla RN  Outcome: Progressing     Problem: Communication Impairment  Goal: Ability to express needs and understand communication  Description  Assess patient's communication skills and ability to understand information  Patient will demonstrate use of effective communication techniques, alternative methods of communication and understanding even if not able to speak  - Encourage communication and provide alternate methods of communication as needed  - Collaborate with case management/ for discharge needs  - Include patient/family/caregiver in decisions related to communication    7/26/2019 1601 by dArian Escamilla RN  Outcome: Adequate for Discharge  7/26/2019 1148 by Adrian Escamilla RN  Outcome: Progressing     Problem: Potential for Aspiration  Goal: Non-ventilated patient's risk of aspiration is minimized  Description  Assess and monitor vital signs, respiratory status, and labs (WBC)  Monitor for signs of aspiration (tachypnea, cough, rales, wheezing, cyanosis, fever)  - Assess and monitor patient's ability to swallow  - Place patient up in chair to eat if possible  - HOB up at 90 degrees to eat if unable to get patient up into chair   - Supervise patient during oral intake  - Instruct patient to take small bites  - Instruct patient to take small single sips when taking liquids  - Follow patient-specific strategies generated by speech pathologist   7/26/2019 1601 by Lydia Westfall RN  Outcome: Adequate for Discharge  7/26/2019 1148 by Lydia Westfall RN  Outcome: Progressing     Problem: Nutrition  Goal: Nutrition/Hydration status is improving  Description  Monitor and assess patient's nutrition/hydration status for malnutrition (ex- brittle hair, bruises, dry skin, pale skin and conjunctiva, muscle wasting, smooth red tongue, and disorientation)  Collaborate with interdisciplinary team and initiate plan and interventions as ordered  Monitor patient's weight and dietary intake as ordered or per policy  Utilize nutrition screening tool and intervene per policy  Determine patient's food preferences and provide high-protein, high-caloric foods as appropriate  - Assist patient with eating   - Allow adequate time for meals   - Encourage patient to take dietary supplement as ordered  - Collaborate with clinical nutritionist   - Include patient/family/caregiver in decisions related to nutrition    7/26/2019 1601 by Lydia Westfall RN  Outcome: Adequate for Discharge  7/26/2019 1148 by Lydia Westfall RN  Outcome: Progressing

## 2019-07-26 NOTE — ASSESSMENT & PLAN NOTE
-Hb down 2g from 15 months ago  -Heme NEG  -iron low and ferritin 8  -pt given IV Venofer and will be discharged on oral iron supplement

## 2019-07-26 NOTE — ASSESSMENT & PLAN NOTE
Her a flutter which was found overnight last night on telemetry is probably the trigger for her admission  At this time she has been switched appropriately to Eliquis  She will likely be following up with the Cardiology

## 2019-07-26 NOTE — DISCHARGE SUMMARY
Discharge- Nate Douglas 12/16/1930, 80 y o  female MRN: [id number]    Unit/Bed#: [address] [place] [address] Encounter: [id number]    Primary Care Provider: Dannielle Chow   Date and time admitted to hospital: 7/25/2019  1:51 PM        Paroxysmal atrial fibrillation (HCC)  Assessment & Plan  -noted on telemetry the night prior to discharge, new diagnosis this admission  -start Eliquis  -stop Plavix/ASA (discussed with Dr Maya Chandler)  -appreciate Cardiology  -currently rate controlled    Iron deficiency anemia secondary to inadequate dietary iron intake  Assessment & Plan  -Hb down 2g from 15 months ago  -Heme NEG  -iron low and ferritin 8  -pt given IV Venofer and will be discharged on oral iron supplement      Acquired hypothyroidism  Assessment & Plan  -cont Synthroid  -TSH nl    Essential hypertension  Assessment & Plan  -cont Lisinopril/HCTZ    History of R anterior MCA CVA without residual deficits  Assessment & Plan  -pt with h/o CVA  -see above      * Lightheadedness  Assessment & Plan  -patient reports she had a few minutes of lightheadedness earlier the morning of admission  It resolved and has not recurred   -denies positional lightheadedness  -patient reports systolic blood pressure just after the episode was 90, this event could have been due to orthostatic hypotension  -the ER attending discussed the case with Neurology and Neurology recommended admission under the stroke pathway  -CT Brain: No acute intracranial abnormality  -MRI brain: Evolution of right lateral frontal lobe infarction which had been acute in April 2018   No or new intracranial ischemia   No intracranial hemorrhage  Advanced microangiopathic changes again noted    -TSH nl        Discharging Physician / Practitioner: Tomi Morocho MD  PCP: Dannielle Chow  Admission Date:   Admission Orders (From admission, onward)     Ordered        07/25/19 1623  Inpatient Admission  Once                   Discharge Date: 07/26/19    Resolved Problems Date Reviewed: 7/26/2019    None          Consultations During Hospital Stay:  · Neurology  · Cardiology    Procedures Performed:   · None    Significant Findings / Test Results:   · See above    Incidental Findings:   · Atrial Fibrillation, new onset     Test Results Pending at Discharge (will require follow up): · None     Outpatient Tests Requested:  · None    Complications:  None    Reason for Admission:  [place] Hospital Course:     Rach Chery is a 80 y o  female patient who originally presented to the hospital on 7/25/2019 due to lightheadedness as outlined in the H&P done yesterday    The patient, initially admitted to the hospital as inpatient, was discharged earlier than expected given the following: Rapid resolution of symptoms and completion of testing  Please see above list of diagnoses and related plan for additional information  Condition at Discharge: good     Discharge Day Visit / Exam:     Subjective:  Patient without acute complaints  Vitals: Blood Pressure: 114/72 (07/26/19 0500)  Pulse: 90 (07/26/19 0500)  Temperature: 98 °F (36 7 °C) (07/26/19 0500)  Temp Source: Oral (07/26/19 0500)  Respirations: 14 (07/26/19 0500)  Height: 5' 2 4" (158 5 cm) (07/25/19 1805)  Weight - Scale: 64 5 kg (142 lb 3 2 oz) (07/25/19 1805)  SpO2: 92 % (07/26/19 0500)  Exam:   Physical Exam   Constitutional: She is oriented to person, place, and time  She appears well-developed and well-nourished  HENT:   Head: Normocephalic  Eyes: Pupils are equal, round, and reactive to light  EOM are normal    Neck: Normal range of motion  Neck supple  Cardiovascular: Normal rate and normal heart sounds  No murmur heard  irreg/irreg   Pulmonary/Chest: Effort normal and breath sounds normal    Abdominal: Soft  Bowel sounds are normal    Neurological: She is alert and oriented to person, place, and time  No cranial nerve deficit  Skin: Skin is warm and dry     Psychiatric: She has a normal mood and affect  Vitals reviewed  Discharge instructions/Information to patient and family:   See after visit summary for information provided to patient and family  Provisions for Follow-Up Care:  See after visit summary for information related to follow-up care and any pertinent home health orders  Disposition:     Home    For Discharges to [facility] SNF:   · Not Applicable to this Patient - Not Applicable to this Patient    Planned Readmission: None     Discharge Statement:  I spent 31 minutes discharging the patient  This time was spent on the day of discharge  I had direct contact with the patient on the day of discharge  Greater than 50% of the total time was spent examining patient, answering all patient questions, arranging and discussing plan of care with patient as well as directly providing post-discharge instructions  Additional time then spent on discharge activities  Discharge Medications:  See after visit summary for reconciled discharge medications provided to patient and family        ** Please Note: This note has been constructed using a voice recognition system **

## 2019-07-26 NOTE — SOCIAL WORK
LOS: 1  GMLOS: 2 7  Pateint is not a documented Bundle or a 30 day readmission  Met with patient  Explained role of care management  Patient lives in a two story home with her kenia Andrade and her two great grandchildren ages 6 and 15  She is independent adl's and ambulation, uses a cane outside the home, family transports and provides meals  Tracy Inman is home during the day during the summer months, works during the day over the winter  2 sons and a daughter live nearby  [facility] - cane  Past services - denies history of MH, D&A or SNF admission  Pharmacy of choice is [facility]  She has a prescription plan and is able to afford her medications  She is on social security on a fixed income  She plans on returning home at discharge and does not anticipate any discharge needs  Will follow

## 2019-07-26 NOTE — OCCUPATIONAL THERAPY NOTE
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    Essential hypertension    Other hyperlipidemia    Acquired hypothyroidism    Lightheadedness    Iron deficiency anemia secondary to inadequate dietary iron intake    Paroxysmal atrial fibrillation [facility])     Past Medical History  Past Medical History:   Diagnosis Date    Disease of thyroid gland     Hyperlipidemia     Hypertension     Stroke [facility])      Past Surgical History  Past Surgical History:   Procedure Laterality Date    EYE SURGERY      b/l cataracts    ROTATOR CUFF REPAIR        Time: [id number] 07/26/19 1448   Note Type   Note type Eval only   Restrictions/Precautions   Weight Bearing Precautions Per Order No   Braces or Orthoses   (none)   Pain Assessment   Pain Assessment No/denies pain   Pain Score No Pain   Home Living   Type of Home House   Home Layout Two level;Performs ADLs on one level; Able to live on main level with bedroom/bathroom; Bed/bath upstairs;1/2 bath on main level;Stairs to enter with rails  (2STE L asc HR)   Bathroom Shower/Tub Walk-in shower   Bathroom Toilet Standard   Bathroom Equipment Grab bars in shower; Shower chair   [address] Walker;Cane;Grab bars   Prior Function   Level of [place] Independent with ADLs and functional mobility; Needs assistance with IADLs   Lives With Daughter;Family   Receives Help From Family   ADL Assistance Independent   IADLs Needs assistance   Falls in the last 6 months 0   Vocational Retired   Psychosocial   Psychosocial (WDL) WDL   Subjective   Subjective "I think I'm all good I feel great"   ADL   Eating Assistance 7  Independent   Grooming Assistance 6  Modified Independent  (standing at sink)   [address] [address] Supervision/Setup   UB Dressing Assistance 6  Modified independent   LB Dressing Assistance 6  Modified independent   Toileting Assistance  5  Supervision/Setup   Additional Comments arrived to patient standing in bathroom getting dressed   Bed Mobility   Additional Comments arrived to pt oob therefore did not assess   Transfers   Sit to Stand 5  Supervision   Stand to Sit 5  Supervision   Stand pivot 5  Supervision   Toilet transfer 5  Supervision   Balance   Static Sitting Good   Dynamic Sitting Good   Static Standing Fair +   Dynamic Standing Fair +   Ambulatory Fair   Activity Tolerance   Activity Tolerance Patient tolerated treatment well   Medical Staff Made Aware yes   Nurse Made Aware OK to see per OBI rasheed   RUE Assessment   RUE Assessment WFL  (mmt 4+/5)   LUE Assessment   LUE Assessment WFL  (mmt 4+/5)   Hand Function   Gross Motor Coordination Functional   Fine Motor Coordination Functional   Sensation   Light Touch No apparent deficits   Sharp/Dull No apparent deficits   Proprioception   Proprioception No apparent deficits   Vision-Basic Assessment   Current Vision No visual deficits   Patient Visual Report   (no acute visual changes throughout OT eval)   Cognition   Overall Cognitive Status WFL   Arousal/Participation Alert; Responsive;Arousable; Cooperative   Attention Within functional limits   Orientation Level Oriented X4   Memory Within functional limits   Following Commands Follows all commands and directions without difficulty   Assessment   Limitation Decreased UE strength;Decreased endurance;Decreased self-care trans;Decreased high-level ADLs   Prognosis Good   Assessment Pt is a seen for OT eval s/p adm to [facility] w/ lightheadedness  Comorbidities include a h/o hx of R anterior MCA CVA w/o residual deficits, hypertension, acquired hypothyroidism, iron deficiency anemia and paroxysmal afib  Pt with active OT orders and activity as tolerated orders  Pt lives with    Pt was I w/  ADLS and IADLS, drove, & required no use of DME PTA  Pt is currently demonstrating the following occupational deficits: These deficits that are impacting pt's baseline areas of occupation are a result of the following impairments: endurance, activity tolerance, balance and strength  The following Occupational Performance Areas to address include: bathing/shower, toilet hygiene, dressing, health maintenance and functional mobility  Pt scored overall /100 on the Barthel Index  Based on the aforementioned OT evaluation, functional performance deficits, and assessments, pt has been identified as a moderate complexity evaluation  Recommend home with family support upon D/C  Pt to continue to benefit from acute immediate OT services to address the following goals (see below)  3-5x/week to  w/in 7-10 days:    Goals   Patient Goals to go home   Long Term Goal see below   Plan   Treatment Interventions ADL retraining;Functional transfer training;UE strengthening/ROM; Endurance training;Patient/family training; Compensatory technique education; Energy conservation; Activityengagement   Goal Expiration Date 19   Treatment Day 0   OT Frequency 3-5x/wk   Recommendation   OT Discharge Recommendation Home with family support   OT - OK to Discharge Yes  (when medically stable)   Barthel Index   Feeding 10   Bathing 5   Grooming Score 5   Dressing Score 10   Bladder Score 10   Bowels Score 10   Toilet Use Score 5   Transfers (Bed/Chair) Score 15   Mobility (Level Surface) Score 0   Stairs Score 10   Barthel Index Score 80       GOALS    1) Pt will improve activity tolerance to G for min 30 min txment sessions    2) Pt will complete UB/LB dressing/self care w/ mod I using adaptive device and DME as needed    3) Pt will complete bathing w/ Mod I w/ use of AE and DME as needed    4) Pt will complete toileting w/ mod I w/ G hygiene/thoroughness using DME as needed    5) Pt will improve functional transfers to Mod I on/off all surfaces using DME as needed w/ G balance/safety     6) Pt will improve functional mobility during ADL/IADL/leisure tasks to Mod I using DME as needed w/ G balance/safety     7) Pt will participate in simulated IADL management task to increase independence to Mod I w/ G safety and endurance    8) Pt will demonstrate G attention for 10 minutes during ongoing cognitive assessment to assist w/ safe d/c planning/recommendations    9) Pt will demonstrate G carryover of pt/caregiver education and training as appropriate w/ mod I w/o cues w/ good tolerance    10) Pt will demonstrate 100% carryover of energy conservation techniques w/ mod I t/o functional I/ADL/leisure tasks w/o cues s/p skilled education      Michael Gudino MS, OTR/L

## 2019-07-26 NOTE — ASSESSMENT & PLAN NOTE
-noted on telemetry the night prior to discharge, new diagnosis this admission  -start Eliquis  -stop Plavix/ASA (discussed with Dr Jessica Austin)  -appreciate Cardiology  -currently rate controlled

## 2019-07-26 NOTE — PLAN OF CARE
Problem: SAFETY ADULT  Goal: Patient will remain free of falls  Description  INTERVENTIONS:  - Assess patient frequently for physical needs  -  Identify cognitive and physical deficits and behaviors that affect risk of falls    -  [place] fall precautions as indicated by assessment   - Educate patient/family on patient safety including physical limitations  - Instruct patient to call for assistance with activity based on assessment  - Modify environment to reduce risk of injury  - Consider OT/PT consult to assist with strengthening/mobility  Outcome: Progressing  Goal: Maintain or return to baseline ADL function  Description  INTERVENTIONS:  -  Assess patient's ability to carry out ADLs; assess patient's baseline for ADL function and identify physical deficits which impact ability to perform ADLs (bathing, care of mouth/teeth, toileting, grooming, dressing, etc )  - Assess/evaluate cause of self-care deficits   - Assess range of motion  - Assess patient's mobility; develop plan if impaired  - Assess patient's need for assistive devices and provide as appropriate  - Encourage maximum independence but intervene and supervise when necessary  ¯ Involve family in performance of ADLs  ¯ Assess for home care needs following discharge   ¯ Request OT consult to assist with ADL evaluation and planning for discharge  ¯ Provide patient education as appropriate  Outcome: Progressing  Goal: Maintain or return mobility status to optimal level  Description  INTERVENTIONS:  - Assess patient's baseline mobility status (ambulation, transfers, stairs, etc )    - Identify cognitive and physical deficits and behaviors that affect mobility  - Identify mobility aids required to assist with transfers and/or ambulation (gait belt, sit-to-stand, lift, walker, cane, etc )  - [place] fall precautions as indicated by assessment  - Record patient progress and toleration of activity level on Mobility SBAR; progress patient to next Phase/Stage  - Instruct patient to call for assistance with activity based on assessment  - Request Rehabilitation consult to assist with strengthening/weightbearing, etc   Outcome: Progressing     Problem: DISCHARGE PLANNING  Goal: Discharge to home or other facility with appropriate resources  Description  INTERVENTIONS:  - Identify barriers to discharge w/patient and caregiver  - Arrange for needed discharge resources and transportation as appropriate  - Identify discharge learning needs (meds, wound care, etc )  - Arrange for interpretive services to assist at discharge as needed  - Refer to Case Management Department for coordinating discharge planning if the patient needs post-hospital services based on physician/advanced practitioner order or complex needs related to functional status, cognitive ability, or social support system  Outcome: Progressing     Problem: Knowledge Deficit  Goal: Patient/family/caregiver demonstrates understanding of disease process, treatment plan, medications, and discharge instructions  Description  Complete learning assessment and assess knowledge base  Interventions:  - Provide teaching at level of understanding  - Provide teaching via preferred learning methods  Outcome: Progressing     Problem: NEUROSENSORY - ADULT  Goal: Achieves stable or improved neurological status  Description  INTERVENTIONS  - Monitor and report changes in neurological status  - Initiate measures to prevent increased intracranial pressure  - Maintain blood pressure and fluid volume within ordered parameters to optimize cerebral perfusion  - Monitor temperature, glucose, and sodium or any other associated labs   Initiate appropriate interventions as ordered  - Monitor for seizure activity   - Administer anti-seizure medications as ordered  Outcome: Progressing  Goal: Absence of seizures  Description  INTERVENTIONS  - Monitor for seizure activity  - Administer anti-seizure medications as ordered  - Monitor neurological status  Outcome: Progressing  Goal: Remains free of injury related to seizures activity  Description  INTERVENTIONS  - Maintain airway, patient safety  and administer oxygen as ordered  - Monitor patient for seizure activity, document and report duration and description of seizure to physician/advanced practitioner  - If seizure occurs,  ensure patient safety during seizure  - Reorient patient post seizure  - Seizure pads on all 4 side rails  - Instruct patient/family to notify RN of any seizure activity including if an aura is experienced  - Instruct patient/family to call for assistance with activity based on nursing assessment  - Administer anti-seizure medications as ordered  - Monitor fetal well being  Outcome: Progressing  Goal: Achieves maximal functionality and self care  Description  INTERVENTIONS  - Monitor swallowing and airway patency with patient fatigue and changes in neurological status  - Encourage and assist patient to increase activity and self care with guidance from rehab services  - Encourage visually impaired, hearing impaired and aphasic patients to use assistive/communication devices  Outcome: Progressing     Problem: CARDIOVASCULAR - ADULT  Goal: Maintains optimal cardiac output and hemodynamic stability  Description  INTERVENTIONS:  - Monitor I/O, vital signs and rhythm  - Monitor for S/S and trends of decreased cardiac output i e  bleeding, hypotension  - Administer and titrate ordered vasoactive medications to optimize hemodynamic stability  - Assess quality of pulses, skin color and temperature  - Assess for signs of decreased coronary artery perfusion - ex   Angina  - Instruct patient to report change in severity of symptoms  Outcome: Progressing  Goal: Absence of cardiac dysrhythmias or at baseline rhythm  Description  INTERVENTIONS:  - Continuous cardiac monitoring, monitor vital signs, obtain 12 lead EKG if indicated  - Administer antiarrhythmic and heart rate control medications as ordered  - Monitor electrolytes and administer replacement therapy as ordered  Outcome: Progressing

## 2019-07-26 NOTE — PLAN OF CARE
Problem: SAFETY ADULT  Goal: Patient will remain free of falls  Description  INTERVENTIONS:  - Assess patient frequently for physical needs  -  Identify cognitive and physical deficits and behaviors that affect risk of falls    -  [place] fall precautions as indicated by assessment   - Educate patient/family on patient safety including physical limitations  - Instruct patient to call for assistance with activity based on assessment  - Modify environment to reduce risk of injury  - Consider OT/PT consult to assist with strengthening/mobility  Outcome: Progressing  Goal: Maintain or return to baseline ADL function  Description  INTERVENTIONS:  -  Assess patient's ability to carry out ADLs; assess patient's baseline for ADL function and identify physical deficits which impact ability to perform ADLs (bathing, care of mouth/teeth, toileting, grooming, dressing, etc )  - Assess/evaluate cause of self-care deficits   - Assess range of motion  - Assess patient's mobility; develop plan if impaired  - Assess patient's need for assistive devices and provide as appropriate  - Encourage maximum independence but intervene and supervise when necessary  ¯ Involve family in performance of ADLs  ¯ Assess for home care needs following discharge   ¯ Request OT consult to assist with ADL evaluation and planning for discharge  ¯ Provide patient education as appropriate  Outcome: Progressing  Goal: Maintain or return mobility status to optimal level  Description  INTERVENTIONS:  - Assess patient's baseline mobility status (ambulation, transfers, stairs, etc )    - Identify cognitive and physical deficits and behaviors that affect mobility  - Identify mobility aids required to assist with transfers and/or ambulation (gait belt, sit-to-stand, lift, walker, cane, etc )  - [place] fall precautions as indicated by assessment  - Record patient progress and toleration of activity level on Mobility SBAR; progress patient to next Phase/Stage  - Instruct patient to call for assistance with activity based on assessment  - Request Rehabilitation consult to assist with strengthening/weightbearing, etc   Outcome: Progressing     Problem: DISCHARGE PLANNING  Goal: Discharge to home or other facility with appropriate resources  Description  INTERVENTIONS:  - Identify barriers to discharge w/patient and caregiver  - Arrange for needed discharge resources and transportation as appropriate  - Identify discharge learning needs (meds, wound care, etc )  - Arrange for interpretive services to assist at discharge as needed  - Refer to Case Management Department for coordinating discharge planning if the patient needs post-hospital services based on physician/advanced practitioner order or complex needs related to functional status, cognitive ability, or social support system  Outcome: Progressing     Problem: Knowledge Deficit  Goal: Patient/family/caregiver demonstrates understanding of disease process, treatment plan, medications, and discharge instructions  Description  Complete learning assessment and assess knowledge base  Interventions:  - Provide teaching at level of understanding  - Provide teaching via preferred learning methods  Outcome: Progressing     Problem: Neurological Deficit  Goal: Neurological status is stable or improving  Description  Interventions:  - Monitor and assess patient's level of consciousness, motor function, sensory function, and level of assistance needed for ADLs  - Monitor and report changes from baseline  Collaborate with interdisciplinary team to initiate plan and implement interventions as ordered  - Provide and maintain a safe environment  - Utilize seizure precautions  - Utilize fall precautions  - Utilize aspiration precautions  - Utilize bleeding precautions  Outcome: Progressing     Problem:  Activity Intolerance/Impaired Mobility  Goal: Mobility/activity is maintained at optimum level for patient  Description  Interventions:  - Assess and monitor patient  barriers to mobility and need for assistive/adaptive devices  - Assess patient's emotional response to limitations  - Collaborate with interdisciplinary team and initiate plans and interventions as ordered  - Encourage independent activity per ability   - Maintain proper body alignment  - Perform active/passive rom as tolerated/ordered  - Plan activities to conserve energy   - Turn patient  Outcome: Progressing     Problem: Communication Impairment  Goal: Ability to express needs and understand communication  Description  Assess patient's communication skills and ability to understand information  Patient will demonstrate use of effective communication techniques, alternative methods of communication and understanding even if not able to speak  - Encourage communication and provide alternate methods of communication as needed  - Collaborate with case management/ for discharge needs  - Include patient/family/caregiver in decisions related to communication  Outcome: Progressing     Problem: Potential for Aspiration  Goal: Non-ventilated patient's risk of aspiration is minimized  Description  Assess and monitor vital signs, respiratory status, and labs (WBC)  Monitor for signs of aspiration (tachypnea, cough, rales, wheezing, cyanosis, fever)  - Assess and monitor patient's ability to swallow  - Place patient up in chair to eat if possible  - HOB up at 90 degrees to eat if unable to get patient up into chair   - Supervise patient during oral intake  - Instruct patient to take small bites  - Instruct patient to take small single sips when taking liquids    - Follow patient-specific strategies generated by speech pathologist   Outcome: Progressing     Problem: Nutrition  Goal: Nutrition/Hydration status is improving  Description  Monitor and assess patient's nutrition/hydration status for malnutrition (ex- brittle hair, bruises, dry skin, pale skin and conjunctiva, muscle wasting, smooth red tongue, and disorientation)  Collaborate with interdisciplinary team and initiate plan and interventions as ordered  Monitor patient's weight and dietary intake as ordered or per policy  Utilize nutrition screening tool and intervene per policy  Determine patient's food preferences and provide high-protein, high-caloric foods as appropriate  - Assist patient with eating   - Allow adequate time for meals   - Encourage patient to take dietary supplement as ordered  - Collaborate with clinical nutritionist   - Include patient/family/caregiver in decisions related to nutrition    Outcome: Progressing

## 2019-07-26 NOTE — NURSING NOTE
Discharge instructions reviewed with patient and patient's son  Patient given information/education regarding atrial fibrillation and eliquis  Patient discharged home with son

## 2019-07-29 NOTE — UTILIZATION REVIEW
Notification of Discharge  This is a Notification of Discharge from our facility [address]  Please be advised that this patient has been discharge from our facility  Below you will find the admission and discharge date and time including the patients disposition  PRESENTATION DATE: 7/25/2019  1:51 PM  OBS ADMISSION DATE:   IP ADMISSION DATE: 7/25/19 1623   DISCHARGE DATE: 7/26/2019  3:32 PM  DISPOSITION: Home/Self Care Home/Self Care   Admission Orders listed below:  Admission Orders (From admission, onward)     Ordered        07/25/19 1623  Inpatient Admission  Once                   Please contact the UR Department if additional information is required to close this patient's authorization/case  [address]  Utilization Review Department  Phone: [phone]; Fax [phone]  [email or web address]  ATTENTION: Please call with any questions or concerns to [phone]  and carefully listen to the prompts so that you are directed to the right person  Send all requests for admission clinical reviews, approved or denied determinations and any other requests to fax [phone]   All voicemails are confidential

## 2019-08-07 NOTE — PROGRESS NOTES
Hospital  Follow Up Office Visit Note  Mirian Nam   80 y o    female   MRN: [id number]  [place] [id number] [address]  [place] [address] [facility] [id number] [phone]    PCP: Aileen Dorado  Cardiologist:  Will be Dr Nini Marques           Assessment/plan  Atrial fibrillation, , new onset on Eliquis 5 b i d  (79 yo, 65 kg, Cr 0 9)   --EKG today:  Controlled AFib at 67 beats per minute  Similar to prior EKG  Mitral regurgitation, moderate  Anemia, iron deficient  Hemoccult negative  symptomatic  Recent hospital admission   --hemoglobin 9 4 July 26 down from 12 4 April 2018  Essential Hypertension  /64  On HCTZ 12 5 mg daily, lisinopril 20 mg/d  History R anterior MCA CVA without residual deficits 4/18  -( was on ASA and Plavix per neurology--DCd, now that on Eliquis)  Hyperlipidemia, on atorvastatin 40 mg daily  LDL 41 July 2019  Hypothyroid on replacement  Cardiac testing  --TTE 4/17/18  EF 65  Grade 1 diastolic dysfunction  No RWMA  Moderate mitral regurgitation  Moderate left atrial enlargement  Summary of recommendations  CBC weekly x 2  An argument could be made for a clinical reduction and Eliquis given her advanced age and near borderline low BMI, given her unexplained iron  Will leave that to the discretion of her cardiologist  Follow up will be scheduled with Dr Nini Marques 2 months          HPI  Mirian Nam is an 79 yo female who had a prior stroke  She also has hypertension and hyperlipidemia, both treated  She was recently hospitalized with fatigue and lightheadedness  She was found to be anemic as an outpatient and subsequent was admitted  She was Hemoccult negative  She had low iron and ferritin  She was given IV Venofer and will be discharged on oral iron supplement  She was found to have atrial fibrillation which is a new diagnosis for her  8/8/19 she presents for cardiology follow-up, accompanied by her granddaughter    She feels well   She has no complaints  She is compliant with medicines  EKG shows controlled atrial fibrillation, on no AV danyelle agents  On iron- now does have some related black stools  The few days ago of some mild lightheadedness  Could make sure she maintains adequate hydration  She has follow-up with GI  She is agreeable to getting CBC weekly x2          Assessment  Diagnoses and all orders for this visit:    Paroxysmal atrial fibrillation ([address] )    Other hyperlipidemia    Essential hypertension    Iron deficiency anemia secondary to inadequate dietary iron intake          Past Medical History:   Diagnosis Date    Disease of thyroid gland     Hyperlipidemia     Hypertension     Stroke [facility])        Review of Systems   Constitution: Negative for chills  Cardiovascular: Negative for chest pain, claudication, cyanosis, dyspnea on exertion, irregular heartbeat, leg swelling, near-syncope, orthopnea, palpitations, paroxysmal nocturnal dyspnea and syncope  Respiratory: Negative for cough and shortness of breath  Gastrointestinal: Negative for heartburn and nausea  Neurological: Negative for dizziness, focal weakness, headaches, light-headedness and weakness  All other systems reviewed and are negative  No Known Allergies        Current Outpatient Medications:     apixaban (ELIQUIS) 5 mg, Take 1 tablet (5 mg total) by mouth 2 (two) times a day, Disp: 60 tablet, Rfl: 0    atorvastatin (LIPITOR) 40 mg tablet, Take 1 tablet (40 mg total) by mouth every evening, Disp: 30 tablet, Rfl: 0    ferrous sulfate 324 (65 Fe) mg, Take 1 tablet (324 mg total) by mouth 2 (two) times a day before meals, Disp: 60 tablet, Rfl: 0    hydrochlorothiazide (HYDRODIURIL) 25 mg tablet, Take 12 5 mg by mouth daily  , Disp: , Rfl:     levothyroxine 75 mcg tablet, Take 1 tablet (75 mcg total) by mouth daily, Disp: 30 tablet, Rfl: 0    lisinopril (ZESTRIL) 10 mg tablet, Take 10 mg by mouth daily at bedtime, Disp: , Rfl:    lisinopril (ZESTRIL) 20 mg tablet, Take 20 mg by mouth daily  , Disp: , Rfl:         Social History     Socioeconomic History    Marital status: Single     Spouse name: Not on file    Number of children: Not on file    Years of education: Not on file    Highest education level: Not on file   Occupational History    Not on file   Social Needs    Financial resource strain: Not on file    Food insecurity:     Worry: Not on file     Inability: Not on file    Transportation needs:     Medical: Not on file     Non-medical: Not on file   Tobacco Use    Smoking status: Never Smoker    Smokeless tobacco: Never Used   Substance and Sexual Activity    Alcohol use: Yes     Alcohol/week: 1 0 standard drinks     Types: 1 Glasses of wine per week     Comment: a glass at night    Drug use: No    Sexual activity: Not on file   Lifestyle    Physical activity:     Days per week: Not on file     Minutes per session: Not on file    Stress: Not on file   Relationships    Social connections:     Talks on phone: Not on file     Gets together: Not on file     Attends Yazidism service: Not on file     Active member of club or organization: Not on file     Attends meetings of clubs or organizations: Not on file     Relationship status: Not on file    Intimate partner violence:     Fear of current or ex partner: Not on file     Emotionally abused: Not on file     Physically abused: Not on file     Forced sexual activity: Not on file   Other Topics Concern    Not on file   Social History Narrative    Not on file       No family history on file  Physical Exam   Constitutional: She is oriented to person, place, and time  No distress  HENT:   Head: Normocephalic and atraumatic  Eyes: Conjunctivae and EOM are normal    Neck: Normal range of motion  Neck supple  Cardiovascular: Normal rate, normal heart sounds and intact distal pulses  An irregularly irregular rhythm present  Abdominal: Soft   Bowel sounds are normal  Musculoskeletal: Normal range of motion  Neurological: She is alert and oriented to person, place, and time  Skin: Skin is warm and dry  She is not diaphoretic  Psychiatric: She has a normal mood and affect  Nursing note and vitals reviewed  Vitals: There were no vitals taken for this visit  Wt Readings from Last 3 Encounters:   19 64 5 kg (142 lb 3 2 oz)   18 67 2 kg (148 lb 2 4 oz)   17 64 kg (141 lb)         Labs & Results:  Lab Results   Component Value Date    WBC 4 88 2019    HGB 9 4 (L) 2019    HCT 30 6 (L) 2019    MCV 74 (L) 2019     2019     No results found for: BNP  No components found for: CHEM  Troponin I   Date Value Ref Range Status   2019 <0 02 <=0 04 ng/mL Final     Comment:     3Autovalidation override  [employer] Chemistry analyzer 99% cutoff is > 0 04 ng/mL in network labs     o cTnI 99% cutoff is useful only when applied to patients in the clinical setting of myocardial ischemia   o cTnI 99% cutoff should be interpreted in the context of clinical history, ECG findings and possibly cardiac imaging to establish correct diagnosis  o cTnI 99% cutoff may be suggestive but clearly not indicative of a coronary event without the clinical setting of myocardial ischemia      2018 <0 02 <=0 04 ng/mL Final     Comment:     3Autovalidation override     Results for orders placed during the hospital encounter of 18   Echo complete with contrast if indicated    Narrative Elyssa Robisonrudidarlene Pizarro [address] [place] [address]  [phone]    Transthoracic Echocardiogram  2D, M-mode, Doppler, and Color Doppler    Study date:  2018    Patient: Dean Alvarez  MR number: [id number]  Account number: [de-identified]  : 16-Dec-1930  Age: 80 years  Gender: Female  Status: Inpatient  Location: Bedside  Height: 61 in  Weight: 155 lb  BP: 174/ 78 mmHg    Indications: TIA      Diagnoses: G45 9 - Transient cerebral ischemic attack, unspecified    Sonographer:  All ZIEGLER, RCS  Primary Physician:  Aidan Topete MD  Referring Physician:  Siobhan Callahan MD  Group:  [facility]  Interpreting Physician:  Milly Diaz MD    SUMMARY    LEFT VENTRICLE:  Systolic function was normal  Ejection fraction was estimated to be 65 %  There were no regional wall motion abnormalities  Wall thickness was mildly to moderately increased  Doppler parameters were consistent with abnormal left ventricular relaxation (grade 1 diastolic dysfunction)  LEFT ATRIUM:  The atrium was moderately dilated  MITRAL VALVE:  There was moderate annular calcification  There was moderate regurgitation  TRICUSPID VALVE:  There was mild regurgitation  HISTORY: PRIOR HISTORY: Risk factors: hypertension and hypercholesterolemia  PROCEDURE: The procedure was performed at the bedside  This was a routine study  The transthoracic approach was used  The study included complete 2D imaging, M-mode, complete spectral Doppler, and color Doppler  Images were obtained from  the parasternal, apical, subcostal, and suprasternal notch acoustic windows  Image quality was adequate  LEFT VENTRICLE: Size was normal  Systolic function was normal  Ejection fraction was estimated to be 65 %  There were no regional wall motion abnormalities  Wall thickness was mildly to moderately increased  DOPPLER: Doppler parameters  were consistent with abnormal left ventricular relaxation (grade 1 diastolic dysfunction)  RIGHT VENTRICLE: The size was normal  Systolic function was normal  Wall thickness was normal     LEFT ATRIUM: The atrium was moderately dilated  RIGHT ATRIUM: Size was normal     MITRAL VALVE: There was moderate annular calcification  Valve structure was normal  There was normal leaflet separation  DOPPLER: Transmitral velocity was minimally increased  There was no evidence for stenosis   There was moderate  regurgitation  AORTIC VALVE: The valve was trileaflet  Leaflets exhibited normal thickness, normal cuspal separation, and sclerosis  DOPPLER: Transaortic velocity was within the normal range  There was no evidence for stenosis  There was no significant  regurgitation  TRICUSPID VALVE: The valve structure was normal  There was normal leaflet separation  DOPPLER: The transtricuspid velocity was within the normal range  There was no evidence for stenosis  There was mild regurgitation  PULMONIC VALVE: Leaflets exhibited normal thickness, no calcification, and normal cuspal separation  DOPPLER: The transpulmonic velocity was within the normal range  There was no significant regurgitation  PERICARDIUM: There was no pericardial effusion  The pericardium was normal in appearance  AORTA: The root exhibited normal size  SYSTEMIC VEINS: IVC: The inferior vena cava was normal in size  PULMONARY VEINS: DOPPLER: Doppler signals were not recordable in the pulmonary vein(s)      SYSTEM MEASUREMENT TABLES    2D  %FS: 31 71 %  Ao Diam: 3 33 cm  EDV(Teich): 72 01 ml  EF(Teich): 60 23 %  ESV(Teich): 28 64 ml  IVSd: 1 34 cm  LA Area: 27 94 cm2  LA Diam: 4 33 cm  LVEDV MOD A4C: 57 81 ml  LVEF MOD A4C: 57 86 %  LVESV MOD A4C: 24 36 ml  LVIDd: 4 05 cm  LVIDs: 2 76 cm  LVLd A4C: 6 8 cm  LVLs A4C: 6 38 cm  LVOT Diam: 2 12 cm  LVPWd: 1 37 cm  RA Area: 14 82 cm2  RVIDd: 3 25 cm  SV MOD A4C: 33 45 ml  SV(Teich): 43 37 ml    CW  HR: 106 6 BPM  MV PHT: 97 43 ms  MV VTI: 38 03 cm  MV Vmax: 1 28 m/s  MV Vmean: 0 68 m/s  MV maxP 52 mmHg  MV meanP 22 mmHg  MVA By PHT: 2 26 cm2  TR Vmax: 2 07 m/s  TR maxP 17 mmHg    MM  TAPSE: 2 58 cm    PW  E': 0 04 m/s  E/E': 29 03  MV A Dusty: 1 25 m/s  MV Dec Arroyo: 3 26 m/s2  MV DecT: 312 73 ms  MV E Dusty: 1 02 m/s  MV E/A Ratio: 0 81  MV PHT: 90 69 ms  MVA By PHT: 2 43 cm2    [employer] Accredited Echocardiography [facility]    Prepared and electronically signed by    Lila Juares MD  Signed 17-Apr-2018 14:36:51       No results found for this or any previous visit  This note was completed in part utilizing m-modal fluency direct voice recognition software  Grammatical errors, random word insertion, spelling mistakes, and incomplete sentences may be an occasional consequence of the system secondary to software limitations, ambient noise and hardware issues  At the time of dictation, efforts were made to edit, clarify and /or correct errors  Please read the chart carefully and recognize, using context, where substitutions have occurred    If you have any questions or concerns about the context, text or information contained within the body of this dictation, please contact myself, the provider, for further clarification

## 2019-08-08 ENCOUNTER — OFFICE VISIT (OUTPATIENT)
Dept: CARDIOLOGY CLINIC | Facility: CLINIC | Age: 84
End: 2019-08-08
Payer: COMMERCIAL

## 2019-08-08 VITALS
DIASTOLIC BLOOD PRESSURE: 64 MMHG | HEIGHT: 61 IN | WEIGHT: 144.4 LBS | BODY MASS INDEX: 27.26 KG/M2 | SYSTOLIC BLOOD PRESSURE: 122 MMHG | HEART RATE: 67 BPM

## 2019-08-08 DIAGNOSIS — I48.0 PAROXYSMAL ATRIAL FIBRILLATION (HCC): Primary | ICD-10-CM

## 2019-08-08 DIAGNOSIS — E78.49 OTHER HYPERLIPIDEMIA: ICD-10-CM

## 2019-08-08 DIAGNOSIS — D50.8 IRON DEFICIENCY ANEMIA SECONDARY TO INADEQUATE DIETARY IRON INTAKE: ICD-10-CM

## 2019-08-08 DIAGNOSIS — I10 ESSENTIAL HYPERTENSION: ICD-10-CM

## 2019-08-08 PROCEDURE — 99214 OFFICE O/P EST MOD 30 MIN: CPT | Performed by: NURSE PRACTITIONER

## 2019-08-08 PROCEDURE — 93000 ELECTROCARDIOGRAM COMPLETE: CPT | Performed by: NURSE PRACTITIONER

## 2019-08-08 NOTE — LETTER
August 8, 2019     [employer]  [address] [facility] [address]  [address] [place] [address] Blvd    Patient: Jose Chapman   YOB: 1930   Date of Visit: 8/8/2019       Dear Dr Celi Arzate: Thank you for referring Jose Chapman to me for evaluation  Below are my notes for this consultation  If you have questions, please do not hesitate to call me  I look forward to following your patient along with you  Sincerely,        IRASEMA Almonte        CC: MD Viridiana Madden CRNP  8/8/2019  3:46 PM  Sign at close encounter  Hospital  Follow Up Office Visit Note  Al Man   80 y o    female   MRN: [id number]  [place] [id number] [facility] [address]  [phone]    PCP: Rivka Jennings  Cardiologist:  Will be Dr Carol Boggs           Assessment/plan  Atrial fibrillation, , new onset on Eliquis 5 b i d  (79 yo, 65 kg, Cr 0 9)   --EKG today:  Controlled AFib at 67 beats per minute  Similar to prior EKG  Mitral regurgitation, moderate  Anemia, iron deficient  Hemoccult negative  symptomatic  Recent hospital admission   --hemoglobin 9 4 July 26 down from 12 4 April 2018  Essential Hypertension  /64  On HCTZ 12 5 mg daily, lisinopril 20 mg/d  History R anterior MCA CVA without residual deficits 4/18  -( was on ASA and Plavix per neurology--DCd, now that on Eliquis)  Hyperlipidemia, on atorvastatin 40 mg daily  LDL 41 July 2019  Hypothyroid on replacement  Cardiac testing  --TTE 4/17/18  EF 65  Grade 1 diastolic dysfunction  No RWMA  Moderate mitral regurgitation  Moderate left atrial enlargement  Summary of recommendations  CBC weekly x 2  An argument could be made for a clinical reduction and Eliquis given her advanced age and near borderline low BMI, given her unexplained iron   Will leave that to the discretion of her cardiologist  Follow up will be scheduled with Dr Carol Boggs 2 months          HPI  Laney Reyes is an 79 yo female who had a prior stroke  She also has hypertension and hyperlipidemia, both treated  She was recently hospitalized with fatigue and lightheadedness  She was found to be anemic as an outpatient and subsequent was admitted  She was Hemoccult negative  She had low iron and ferritin  She was given IV Venofer and will be discharged on oral iron supplement  She was found to have atrial fibrillation which is a new diagnosis for her  8/8/19 she presents for cardiology follow-up, accompanied by her granddaughter  She feels well  She has no complaints  She is compliant with medicines  EKG shows controlled atrial fibrillation, on no AV danyelle agents  On iron- now does have some related black stools  The few days ago of some mild lightheadedness  Could make sure she maintains adequate hydration  She has follow-up with GI  She is agreeable to getting CBC weekly x2          Assessment  Diagnoses and all orders for this visit:    Paroxysmal atrial fibrillation ([facility] [address] )    Other hyperlipidemia    Essential hypertension    Iron deficiency anemia secondary to inadequate dietary iron intake          Past Medical History:   Diagnosis Date    Disease of thyroid gland     Hyperlipidemia     Hypertension     Stroke [facility])        Review of Systems   Constitution: Negative for chills  Cardiovascular: Negative for chest pain, claudication, cyanosis, dyspnea on exertion, irregular heartbeat, leg swelling, near-syncope, orthopnea, palpitations, paroxysmal nocturnal dyspnea and syncope  Respiratory: Negative for cough and shortness of breath  Gastrointestinal: Negative for heartburn and nausea  Neurological: Negative for dizziness, focal weakness, headaches, light-headedness and weakness  All other systems reviewed and are negative  No Known Allergies        Current Outpatient Medications:     apixaban (ELIQUIS) 5 mg, Take 1 tablet (5 mg total) by mouth 2 (two) times a day, Disp: 60 tablet, Rfl: 0    atorvastatin (LIPITOR) 40 mg tablet, Take 1 tablet (40 mg total) by mouth every evening, Disp: 30 tablet, Rfl: 0    ferrous sulfate 324 (65 Fe) mg, Take 1 tablet (324 mg total) by mouth 2 (two) times a day before meals, Disp: 60 tablet, Rfl: 0    hydrochlorothiazide (HYDRODIURIL) 25 mg tablet, Take 12 5 mg by mouth daily  , Disp: , Rfl:     levothyroxine 75 mcg tablet, Take 1 tablet (75 mcg total) by mouth daily, Disp: 30 tablet, Rfl: 0    lisinopril (ZESTRIL) 10 mg tablet, Take 10 mg by mouth daily at bedtime, Disp: , Rfl:     lisinopril (ZESTRIL) 20 mg tablet, Take 20 mg by mouth daily  , Disp: , Rfl:         Social History     Socioeconomic History    Marital status: Single     Spouse name: Not on file    Number of children: Not on file    Years of education: Not on file    Highest education level: Not on file   Occupational History    Not on file   Social Needs    Financial resource strain: Not on file    Food insecurity:     Worry: Not on file     Inability: Not on file    Transportation needs:     Medical: Not on file     Non-medical: Not on file   Tobacco Use    Smoking status: Never Smoker    Smokeless tobacco: Never Used   Substance and Sexual Activity    Alcohol use:  Yes     Alcohol/week: 1 0 standard drinks     Types: 1 Glasses of wine per week     Comment: a glass at night    Drug use: No    Sexual activity: Not on file   Lifestyle    Physical activity:     Days per week: Not on file     Minutes per session: Not on file    Stress: Not on file   Relationships    Social connections:     Talks on phone: Not on file     Gets together: Not on file     Attends Congregation service: Not on file     Active member of club or organization: Not on file     Attends meetings of clubs or organizations: Not on file     Relationship status: Not on file    Intimate partner violence:     Fear of current or ex partner: Not on file     Emotionally abused: Not on file     Physically abused: Not on file     Forced sexual activity: Not on file   Other Topics Concern    Not on file   Social History Narrative    Not on file       No family history on file  Physical Exam   Constitutional: She is oriented to person, place, and time  No distress  HENT:   Head: Normocephalic and atraumatic  Eyes: Conjunctivae and EOM are normal    Neck: Normal range of motion  Neck supple  Cardiovascular: Normal rate, normal heart sounds and intact distal pulses  An irregularly irregular rhythm present  Abdominal: Soft  Bowel sounds are normal    Musculoskeletal: Normal range of motion  Neurological: She is alert and oriented to person, place, and time  Skin: Skin is warm and dry  She is not diaphoretic  Psychiatric: She has a normal mood and affect  Nursing note and vitals reviewed  Vitals: There were no vitals taken for this visit  Wt Readings from Last 3 Encounters:   07/25/19 64 5 kg (142 lb 3 2 oz)   04/18/18 67 2 kg (148 lb 2 4 oz)   01/27/17 64 kg (141 lb)         Labs & Results:  Lab Results   Component Value Date    WBC 4 88 07/26/2019    HGB 9 4 (L) 07/26/2019    HCT 30 6 (L) 07/26/2019    MCV 74 (L) 07/26/2019     07/26/2019     No results found for: BNP  No components found for: CHEM  Troponin I   Date Value Ref Range Status   07/25/2019 <0 02 <=0 04 ng/mL Final     Comment:     3Autovalidation override  [employer] Chemistry analyzer 99% cutoff is > 0 04 ng/mL in network labs     o cTnI 99% cutoff is useful only when applied to patients in the clinical setting of myocardial ischemia   o cTnI 99% cutoff should be interpreted in the context of clinical history, ECG findings and possibly cardiac imaging to establish correct diagnosis     o cTnI 99% cutoff may be suggestive but clearly not indicative of a coronary event without the clinical setting of myocardial ischemia      04/16/2018 <0 02 <=0 04 ng/mL Final     Comment:     3Autovalidation override Results for orders placed during the hospital encounter of 18   Echo complete with contrast if indicated    Narrative [address]  [facility], [address]  [phone]    Transthoracic Echocardiogram  2D, M-mode, Doppler, and Color Doppler    Study date:  2018    Patient: Leighton Renteria  MR number: [id number]  Account number: [de-identified]  : 16-Dec-1930  Age: 80 years  Gender: Female  Status: Inpatient  Location: Bedside  Height: 61 in  Weight: 155 lb  BP: 174/ 78 mmHg    Indications: TIA  Diagnoses: G45 9 - Transient cerebral ischemic attack, unspecified    Sonographer:  Andrei ZIEGLER, RCS  Primary Physician:  Rogerio Barnett MD  Referring Physician:  Marilyn Carrasco MD  Group:  [facility]  Interpreting Physician:  Sarah Hatch MD    SUMMARY    LEFT VENTRICLE:  Systolic function was normal  Ejection fraction was estimated to be 65 %  There were no regional wall motion abnormalities  Wall thickness was mildly to moderately increased  Doppler parameters were consistent with abnormal left ventricular relaxation (grade 1 diastolic dysfunction)  LEFT ATRIUM:  The atrium was moderately dilated  MITRAL VALVE:  There was moderate annular calcification  There was moderate regurgitation  TRICUSPID VALVE:  There was mild regurgitation  HISTORY: PRIOR HISTORY: Risk factors: hypertension and hypercholesterolemia  PROCEDURE: The procedure was performed at the bedside  This was a routine study  The transthoracic approach was used  The study included complete 2D imaging, M-mode, complete spectral Doppler, and color Doppler  Images were obtained from  the parasternal, apical, subcostal, and suprasternal notch acoustic windows  Image quality was adequate  LEFT VENTRICLE: Size was normal  Systolic function was normal  Ejection fraction was estimated to be 65 %  There were no regional wall motion abnormalities   Prerna Be thickness was mildly to moderately increased  DOPPLER: Doppler parameters  were consistent with abnormal left ventricular relaxation (grade 1 diastolic dysfunction)  RIGHT VENTRICLE: The size was normal  Systolic function was normal  Wall thickness was normal     LEFT ATRIUM: The atrium was moderately dilated  RIGHT ATRIUM: Size was normal     MITRAL VALVE: There was moderate annular calcification  Valve structure was normal  There was normal leaflet separation  DOPPLER: Transmitral velocity was minimally increased  There was no evidence for stenosis  There was moderate  regurgitation  AORTIC VALVE: The valve was trileaflet  Leaflets exhibited normal thickness, normal cuspal separation, and sclerosis  DOPPLER: Transaortic velocity was within the normal range  There was no evidence for stenosis  There was no significant  regurgitation  TRICUSPID VALVE: The valve structure was normal  There was normal leaflet separation  DOPPLER: The transtricuspid velocity was within the normal range  There was no evidence for stenosis  There was mild regurgitation  PULMONIC VALVE: Leaflets exhibited normal thickness, no calcification, and normal cuspal separation  DOPPLER: The transpulmonic velocity was within the normal range  There was no significant regurgitation  PERICARDIUM: There was no pericardial effusion  The pericardium was normal in appearance  AORTA: The root exhibited normal size  SYSTEMIC VEINS: IVC: The inferior vena cava was normal in size  PULMONARY VEINS: DOPPLER: Doppler signals were not recordable in the pulmonary vein(s)      SYSTEM MEASUREMENT TABLES    2D  %FS: 31 71 %  Ao Diam: 3 33 cm  EDV(Teich): 72 01 ml  EF(Teich): 60 23 %  ESV(Teich): 28 64 ml  IVSd: 1 34 cm  LA Area: 27 94 cm2  LA Diam: 4 33 cm  LVEDV MOD A4C: 57 81 ml  LVEF MOD A4C: 57 86 %  LVESV MOD A4C: 24 36 ml  LVIDd: 4 05 cm  LVIDs: 2 76 cm  LVLd A4C: 6 8 cm  LVLs A4C: 6 38 cm  LVOT Diam: 2 12 cm  LVPWd: 1 37 cm  RA Area: 14 82 cm2  RVIDd: 3 25 cm  SV MOD A4C: 33 45 ml  SV(Teich): 43 37 ml    CW  HR: 106 6 BPM  MV PHT: 97 43 ms  MV VTI: 38 03 cm  MV Vmax: 1 28 m/s  MV Vmean: 0 68 m/s  MV maxP 52 mmHg  MV meanP 22 mmHg  MVA By PHT: 2 26 cm2  TR Vmax: 2 07 m/s  TR maxP 17 mmHg    MM  TAPSE: 2 58 cm    PW  E': 0 04 m/s  E/E': 29 03  MV A Dusty: 1 25 m/s  MV Dec Concho: 3 26 m/s2  MV DecT: 312 73 ms  MV E Dusty: 1 02 m/s  MV E/A Ratio: 0 81  MV PHT: 90 69 ms  MVA By PHT: 2 43 cm2    [facility] Accredited Echocardiography Laboratory    Prepared and electronically signed by    Adrien Lima MD  Signed 2018 14:36:51       No results found for this or any previous visit  This note was completed in part utilizing [employer] fluency direct voice recognition software  Grammatical errors, random word insertion, spelling mistakes, and incomplete sentences may be an occasional consequence of the system secondary to software limitations, ambient noise and hardware issues  At the time of dictation, efforts were made to edit, clarify and /or correct errors  Please read the chart carefully and recognize, using context, where substitutions have occurred    If you have any questions or concerns about the context, text or information contained within the body of this dictation, please contact myself, the provider, for further clarification

## 2019-08-30 LAB
BASOPHILS # BLD AUTO: 0 X10E3/UL (ref 0–0.2)
BASOPHILS NFR BLD AUTO: 0 %
EOSINOPHIL # BLD AUTO: 0.1 X10E3/UL (ref 0–0.4)
EOSINOPHIL NFR BLD AUTO: 2 %
ERYTHROCYTE [DISTWIDTH] IN BLOOD BY AUTOMATED COUNT: 21.7 % (ref 12.3–15.4)
HCT VFR BLD AUTO: 37.2 % (ref 34–46.6)
HGB BLD-MCNC: 11.4 G/DL (ref 11.1–15.9)
IMM GRANULOCYTES # BLD: 0 X10E3/UL (ref 0–0.1)
IMM GRANULOCYTES NFR BLD: 0 %
LYMPHOCYTES # BLD AUTO: 1.5 X10E3/UL (ref 0.7–3.1)
LYMPHOCYTES NFR BLD AUTO: 24 %
MCH RBC QN AUTO: 25.4 PG (ref 26.6–33)
MCHC RBC AUTO-ENTMCNC: 30.6 G/DL (ref 31.5–35.7)
MCV RBC AUTO: 83 FL (ref 79–97)
MONOCYTES # BLD AUTO: 0.6 X10E3/UL (ref 0.1–0.9)
MONOCYTES NFR BLD AUTO: 10 %
NEUTROPHILS # BLD AUTO: 4.1 X10E3/UL (ref 1.4–7)
NEUTROPHILS NFR BLD AUTO: 64 %
PLATELET # BLD AUTO: 259 X10E3/UL (ref 150–450)
RBC # BLD AUTO: 4.49 X10E6/UL (ref 3.77–5.28)
WBC # BLD AUTO: 6.4 X10E3/UL (ref 3.4–10.8)

## 2020-05-03 ENCOUNTER — HOSPITAL ENCOUNTER (EMERGENCY)
Facility: HOSPITAL | Age: 85
Discharge: HOME | End: 2020-05-03
Attending: EMERGENCY MEDICINE | Admitting: EMERGENCY MEDICINE
Payer: COMMERCIAL

## 2020-05-03 VITALS
BODY MASS INDEX: 28.36 KG/M2 | OXYGEN SATURATION: 98 % | TEMPERATURE: 97.8 F | DIASTOLIC BLOOD PRESSURE: 79 MMHG | SYSTOLIC BLOOD PRESSURE: 146 MMHG | RESPIRATION RATE: 17 BRPM | WEIGHT: 150.1 LBS | HEART RATE: 74 BPM

## 2020-05-03 DIAGNOSIS — R04.0 RIGHT-SIDED EPISTAXIS: Primary | ICD-10-CM

## 2020-05-03 PROCEDURE — 99283 EMERGENCY DEPT VISIT LOW MDM: CPT

## 2020-05-03 PROCEDURE — 30903 CONTROL OF NOSEBLEED: CPT | Performed by: EMERGENCY MEDICINE

## 2020-05-03 PROCEDURE — 99284 EMERGENCY DEPT VISIT MOD MDM: CPT | Performed by: EMERGENCY MEDICINE

## 2020-05-03 RX ORDER — AMOXICILLIN 500 MG/1
500 CAPSULE ORAL 3 TIMES DAILY
Qty: 15 CAPSULE | Refills: 0 | Status: SHIPPED | OUTPATIENT
Start: 2020-05-03 | End: 2020-05-08

## 2020-05-03 RX ORDER — OXYMETAZOLINE HYDROCHLORIDE 0.05 G/100ML
2 SPRAY NASAL ONCE
Status: COMPLETED | OUTPATIENT
Start: 2020-05-03 | End: 2020-05-03

## 2020-05-03 RX ORDER — AMOXICILLIN 250 MG/1
500 CAPSULE ORAL ONCE
Status: COMPLETED | OUTPATIENT
Start: 2020-05-03 | End: 2020-05-03

## 2020-05-03 RX ORDER — TRANEXAMIC ACID 100 MG/ML
1000 INJECTION, SOLUTION INTRAVENOUS ONCE
Status: COMPLETED | OUTPATIENT
Start: 2020-05-03 | End: 2020-05-03

## 2020-05-03 RX ADMIN — SILVER NITRATE APPLICATORS 1 APPLICATOR: 25; 75 STICK TOPICAL at 16:14

## 2020-05-03 RX ADMIN — TRANEXAMIC ACID 1000 MG: 1 INJECTION, SOLUTION INTRAVENOUS at 16:15

## 2020-05-03 RX ADMIN — AMOXICILLIN 500 MG: 250 CAPSULE ORAL at 17:37

## 2020-05-03 RX ADMIN — OXYMETAZOLINE HYDROCHLORIDE 2 SPRAY: 0.05 SPRAY NASAL at 16:15

## 2021-02-12 DIAGNOSIS — Z23 ENCOUNTER FOR IMMUNIZATION: ICD-10-CM

## 2022-03-24 ENCOUNTER — NURSING HOME VISIT (OUTPATIENT)
Dept: FAMILY MEDICINE CLINIC | Facility: CLINIC | Age: 87
End: 2022-03-24
Payer: COMMERCIAL

## 2022-03-24 DIAGNOSIS — B96.81 HELICOBACTER PYLORI GASTRITIS: ICD-10-CM

## 2022-03-24 DIAGNOSIS — K29.00 ACUTE GASTRITIS WITHOUT HEMORRHAGE, UNSPECIFIED GASTRITIS TYPE: ICD-10-CM

## 2022-03-24 DIAGNOSIS — K29.70 HELICOBACTER PYLORI GASTRITIS: ICD-10-CM

## 2022-03-24 DIAGNOSIS — I48.0 PAROXYSMAL ATRIAL FIBRILLATION (HCC): Primary | ICD-10-CM

## 2022-03-24 DIAGNOSIS — I10 ESSENTIAL HYPERTENSION: ICD-10-CM

## 2022-03-24 DIAGNOSIS — E87.6 HYPOKALEMIA: ICD-10-CM

## 2022-03-24 PROCEDURE — 99309 SBSQ NF CARE MODERATE MDM 30: CPT | Performed by: NURSE PRACTITIONER

## 2022-03-24 NOTE — PROGRESS NOTES
[address]  Facility: [facility]    NAME: Rylee Verdin  AGE: 80 y o  SEX: female    DATE OF ENCOUNTER: 3/24/2022    Code status:  Full Code    Assessment and Plan     1  Paroxysmal atrial fibrillation (HCC)    2  Essential hypertension    3  Acute gastritis without hemorrhage, unspecified gastritis type    4  Helicobacter pylori gastritis    5  Hypokalemia        All medications and routine orders were reviewed and updated as needed  Plan discussed with: Patient, son, nursing staff    Chief Complaint     Interim evaluation    History of Present Illness     Himanshu Gupta was assessed for follow up  She is feeling well, feels she is getting stronger with PT  Her appetite is improving and her bowels are stable  Her BMP shows +MARISOL with creatinine 1 48, GFR 33  Per [facility] notes they held her lisinopril and HCTZ but started lasix 20mg po daily and Kdur  Bp soft, HR 60-90  She denies pain, SOB/CP/dysuria  Weight is stable  The following portions of the patient's history were reviewed and updated as appropriate: current medications, past family history, past medical history, past social history, past surgical history and problem list     Allergies:  No Known Allergies    Review of Systems     Review of Systems   Constitutional: Negative  Negative for activity change, appetite change, chills, fatigue and fever  HENT: Negative  Negative for congestion, ear pain, postnasal drip, sinus pain and trouble swallowing  Eyes: Negative  Respiratory: Negative  Negative for cough and shortness of breath  Cardiovascular: Negative  Negative for chest pain, palpitations and leg swelling  Gastrointestinal: Negative  Negative for constipation and diarrhea  Endocrine: Negative  Genitourinary: Negative  Negative for dysuria  Musculoskeletal: Positive for gait problem  Skin: Negative  Allergic/Immunologic: Negative    Negative for immunocompromised state  Neurological: Negative for dizziness and light-headedness  Hematological: Negative  Psychiatric/Behavioral: Negative  Negative for sleep disturbance  Medications and orders     All medications reviewed and updated in Nursing Home EMR  Objective     Vitals: per nursing home records    Physical Exam  Vitals and nursing note reviewed  Constitutional:       Appearance: Normal appearance  HENT:      Head: Normocephalic and atraumatic  Right Ear: Tympanic membrane, ear canal and external ear normal       Left Ear: Tympanic membrane, ear canal and external ear normal       Nose: Nose normal       Mouth/Throat:      Mouth: Mucous membranes are moist       Pharynx: Oropharynx is clear  Eyes:      Extraocular Movements: Extraocular movements intact  Conjunctiva/sclera: Conjunctivae normal       Pupils: Pupils are equal, round, and reactive to light  Cardiovascular:      Rate and Rhythm: Normal rate  Rhythm irregular  Pulses: Normal pulses  Heart sounds: Normal heart sounds  Pulmonary:      Effort: Pulmonary effort is normal       Breath sounds: Normal breath sounds  Abdominal:      General: Bowel sounds are normal       Palpations: Abdomen is soft  Musculoskeletal:         General: Normal range of motion  Cervical back: Normal range of motion and neck supple  Right lower leg: No edema  Left lower leg: No edema  Skin:     General: Skin is warm and dry  Coloration: Skin is pale  Comments: PVD   Neurological:      General: No focal deficit present  Mental Status: She is alert and oriented to person, place, and time  Psychiatric:         Mood and Affect: Mood normal          Behavior: Behavior normal          Thought Content: Thought content normal          Judgment: Judgment normal          Pertinent Laboratory/Diagnostic Studies:      The following labs and studies were reviewed please see chart or hospital paperwork for details  D/C lasix and decrease Kdur to 20mEq po daily  Repeat BMP Monday 3/28/22  After discussion with son Vicenta Mccurdy we will hold off on starting Remeron as her appetite is improving  We will continue to monitor this        IRASEMA Nixon  3/24/2022 1:14 PM

## 2022-03-28 ENCOUNTER — NURSING HOME VISIT (OUTPATIENT)
Dept: FAMILY MEDICINE CLINIC | Facility: CLINIC | Age: 87
End: 2022-03-28
Payer: COMMERCIAL

## 2022-03-28 DIAGNOSIS — I48.0 PAROXYSMAL ATRIAL FIBRILLATION (HCC): Primary | ICD-10-CM

## 2022-03-28 DIAGNOSIS — E87.6 HYPOKALEMIA: ICD-10-CM

## 2022-03-28 DIAGNOSIS — K29.00 ACUTE GASTRITIS WITHOUT HEMORRHAGE, UNSPECIFIED GASTRITIS TYPE: ICD-10-CM

## 2022-03-28 DIAGNOSIS — K29.70 HELICOBACTER PYLORI GASTRITIS: ICD-10-CM

## 2022-03-28 DIAGNOSIS — I10 ESSENTIAL HYPERTENSION: ICD-10-CM

## 2022-03-28 DIAGNOSIS — B96.81 HELICOBACTER PYLORI GASTRITIS: ICD-10-CM

## 2022-03-28 PROCEDURE — 99316 NF DSCHRG MGMT 30 MIN+: CPT | Performed by: NURSE PRACTITIONER

## 2022-03-28 NOTE — PROGRESS NOTES
[address]  [facility]: [facility]    NAME: Roger Newton  AGE: 80 y o  SEX: female    DATE OF ENCOUNTER: 3/28/2022    Code status:  Full Code    Assessment and Plan     1  Paroxysmal atrial fibrillation (HCC)    2  Essential hypertension    3  Helicobacter pylori gastritis    4  Hypokalemia    5  Acute gastritis without hemorrhage, unspecified gastritis type        All medications and routine orders were reviewed and updated as needed  Plan discussed with: Patient, nursing staff    Chief Complaint     Interim evaluation    History of Present Illness     Cecile Carbajal is excited to go home today  She is feeling well, her appetite is improving  She denies pain/SOB/dysuria  Bowels are stable  Her repeat BMP is pending for today  Her Bp is stable after stopping lasix  VS and weight are stable  No recent falls  The following portions of the patient's history were reviewed and updated as appropriate: current medications, past family history, past medical history, past social history, past surgical history and problem list     Allergies:  No Known Allergies    Review of Systems     Review of Systems   Constitutional: Positive for appetite change  Negative for activity change, chills, fatigue and fever  HENT: Negative  Negative for congestion, ear pain, postnasal drip and sinus pain  Eyes: Negative  Respiratory: Negative  Negative for cough, chest tightness and shortness of breath  Cardiovascular: Negative  Negative for chest pain, palpitations and leg swelling  Gastrointestinal: Negative  Negative for constipation and diarrhea  Endocrine: Negative  Genitourinary: Negative  Negative for dysuria  Musculoskeletal: Positive for gait problem  Skin: Negative  Allergic/Immunologic: Negative  Negative for immunocompromised state  Neurological: Negative for dizziness and light-headedness  Hematological: Negative  Psychiatric/Behavioral: Negative  Negative for sleep disturbance  Medications and orders     All medications reviewed and updated in Nursing Home EMR  Objective     Vitals: per nursing home records    Physical Exam  Vitals and nursing note reviewed  Constitutional:       Appearance: Normal appearance  HENT:      Head: Normocephalic and atraumatic  Right Ear: Tympanic membrane, ear canal and external ear normal       Left Ear: Tympanic membrane, ear canal and external ear normal       Nose: Nose normal       Mouth/Throat:      Mouth: Mucous membranes are moist       Pharynx: Oropharynx is clear  Eyes:      Extraocular Movements: Extraocular movements intact  Conjunctiva/sclera: Conjunctivae normal       Pupils: Pupils are equal, round, and reactive to light  Cardiovascular:      Rate and Rhythm: Normal rate  Rhythm irregular  Pulses: Normal pulses  Heart sounds: Murmur heard  Pulmonary:      Effort: Pulmonary effort is normal       Breath sounds: Normal breath sounds  Abdominal:      General: Bowel sounds are normal       Palpations: Abdomen is soft  Musculoskeletal:         General: Normal range of motion  Cervical back: Normal range of motion and neck supple  Right lower leg: No edema  Left lower leg: No edema  Skin:     General: Skin is warm and dry  Coloration: Skin is pale  Comments: PVD   Neurological:      General: No focal deficit present  Mental Status: She is alert and oriented to person, place, and time  Psychiatric:         Mood and Affect: Mood normal          Behavior: Behavior normal          Thought Content: Thought content normal          Judgment: Judgment normal          Pertinent Laboratory/Diagnostic Studies: The following labs and studies were reviewed please see chart or hospital paperwork for details  Awaiting BMP results  Stable for discharge  Paperwork reviewed and signed        Dale Fortune IRASEMA  3/28/2022 10:38 AM

## 2025-07-30 ENCOUNTER — OFFICE VISIT (OUTPATIENT)
Dept: NEPHROLOGY | Facility: CLINIC | Age: OVER 89
End: 2025-07-30
Payer: COMMERCIAL

## 2025-07-30 ENCOUNTER — APPOINTMENT (OUTPATIENT)
Dept: LAB | Facility: HOSPITAL | Age: OVER 89
End: 2025-07-30
Payer: COMMERCIAL

## 2025-07-30 VITALS
HEART RATE: 75 BPM | HEIGHT: 61 IN | OXYGEN SATURATION: 97 % | BODY MASS INDEX: 23.22 KG/M2 | SYSTOLIC BLOOD PRESSURE: 108 MMHG | WEIGHT: 123 LBS | DIASTOLIC BLOOD PRESSURE: 58 MMHG

## 2025-07-30 DIAGNOSIS — N17.9 AKI (ACUTE KIDNEY INJURY) (HCC): ICD-10-CM

## 2025-07-30 DIAGNOSIS — I10 ESSENTIAL HYPERTENSION: Primary | ICD-10-CM

## 2025-07-30 DIAGNOSIS — N18.31 STAGE 3A CHRONIC KIDNEY DISEASE (HCC): ICD-10-CM

## 2025-07-30 DIAGNOSIS — R31.0 GROSS HEMATURIA: ICD-10-CM

## 2025-07-30 PROBLEM — N18.9 CHRONIC KIDNEY DISEASE, UNSPECIFIED: Status: ACTIVE | Noted: 2025-07-30

## 2025-07-30 PROBLEM — N39.0 URINARY TRACT INFECTION, SITE NOT SPECIFIED: Status: ACTIVE | Noted: 2025-07-30

## 2025-07-30 PROBLEM — I38 VALVULAR HEART DISEASE: Status: ACTIVE | Noted: 2025-07-30

## 2025-07-30 PROBLEM — E78.00 PURE HYPERCHOLESTEROLEMIA, UNSPECIFIED: Status: ACTIVE | Noted: 2025-07-30

## 2025-07-30 PROBLEM — D68.9 COAGULATION DEFECT, UNSPECIFIED (HCC): Status: ACTIVE | Noted: 2025-07-30

## 2025-07-30 PROBLEM — R59.1 LYMPHADENOPATHY OF HEAD AND NECK: Status: ACTIVE | Noted: 2025-07-30

## 2025-07-30 PROBLEM — R79.89 ELEVATED SERUM CREATININE: Status: ACTIVE | Noted: 2025-07-30

## 2025-07-30 PROBLEM — N39.41 URGE INCONTINENCE OF URINE: Status: ACTIVE | Noted: 2025-07-30

## 2025-07-30 PROBLEM — Z79.01 LONG TERM CURRENT USE OF ANTICOAGULANT THERAPY: Status: ACTIVE | Noted: 2025-07-30

## 2025-07-30 PROBLEM — R63.4 ABNORMAL WEIGHT LOSS: Status: ACTIVE | Noted: 2025-07-30

## 2025-07-30 PROBLEM — N18.2 CHRONIC KIDNEY DISEASE, STAGE 2 (MILD): Status: ACTIVE | Noted: 2025-07-30

## 2025-07-30 PROBLEM — R63.0 ANOREXIA: Status: ACTIVE | Noted: 2025-07-30

## 2025-07-30 PROBLEM — I50.9 CONGESTIVE HEART FAILURE (CHF) (HCC): Status: ACTIVE | Noted: 2025-07-30

## 2025-07-30 PROBLEM — M81.0 AGE-RELATED OSTEOPOROSIS WITHOUT CURRENT PATHOLOGICAL FRACTURE: Status: ACTIVE | Noted: 2025-07-30

## 2025-07-30 PROBLEM — I48.19 OTHER PERSISTENT ATRIAL FIBRILLATION (HCC): Status: ACTIVE | Noted: 2025-07-30

## 2025-07-30 LAB
25(OH)D3 SERPL-MCNC: 41.8 NG/ML (ref 30–100)
ANION GAP SERPL CALCULATED.3IONS-SCNC: 7 MMOL/L (ref 4–13)
BACTERIA UR QL AUTO: ABNORMAL /HPF
BILIRUB UR QL STRIP: NEGATIVE
BUN SERPL-MCNC: 18 MG/DL (ref 5–25)
CALCIUM SERPL-MCNC: 11.1 MG/DL (ref 8.4–10.2)
CHLORIDE SERPL-SCNC: 103 MMOL/L (ref 96–108)
CLARITY UR: ABNORMAL
CO2 SERPL-SCNC: 27 MMOL/L (ref 21–32)
COLOR UR: ABNORMAL
CREAT SERPL-MCNC: 1.23 MG/DL (ref 0.6–1.3)
GFR SERPL CREATININE-BSD FRML MDRD: 37 ML/MIN/1.73SQ M
GLUCOSE SERPL-MCNC: 93 MG/DL (ref 65–140)
GLUCOSE UR STRIP-MCNC: NEGATIVE MG/DL
HGB UR QL STRIP.AUTO: ABNORMAL
KETONES UR STRIP-MCNC: ABNORMAL MG/DL
LEUKOCYTE ESTERASE UR QL STRIP: ABNORMAL
NITRITE UR QL STRIP: NEGATIVE
NON-SQ EPI CELLS URNS QL MICRO: ABNORMAL /HPF
PH UR STRIP.AUTO: 6 [PH]
PHOSPHATE SERPL-MCNC: 3.3 MG/DL (ref 2.3–4.1)
POTASSIUM SERPL-SCNC: 4.4 MMOL/L (ref 3.5–5.3)
PROT UR STRIP-MCNC: ABNORMAL MG/DL
RBC #/AREA URNS AUTO: ABNORMAL /HPF
SODIUM SERPL-SCNC: 137 MMOL/L (ref 135–147)
SP GR UR STRIP.AUTO: 1.02 (ref 1–1.03)
UROBILINOGEN UR STRIP-ACNC: <2 MG/DL
WBC #/AREA URNS AUTO: ABNORMAL /HPF

## 2025-07-30 PROCEDURE — 84100 ASSAY OF PHOSPHORUS: CPT

## 2025-07-30 PROCEDURE — 36415 COLL VENOUS BLD VENIPUNCTURE: CPT

## 2025-07-30 PROCEDURE — 82306 VITAMIN D 25 HYDROXY: CPT

## 2025-07-30 PROCEDURE — 99204 OFFICE O/P NEW MOD 45 MIN: CPT | Performed by: INTERNAL MEDICINE

## 2025-07-30 PROCEDURE — 80048 BASIC METABOLIC PNL TOTAL CA: CPT

## 2025-07-30 PROCEDURE — 81001 URINALYSIS AUTO W/SCOPE: CPT

## 2025-07-30 RX ORDER — SPIRONOLACTONE 25 MG/1
25 TABLET ORAL DAILY
COMMUNITY
Start: 2025-06-25

## 2025-07-30 RX ORDER — SOLIFENACIN SUCCINATE 10 MG/1
10 TABLET, FILM COATED ORAL DAILY
COMMUNITY

## 2025-07-30 RX ORDER — ALBUTEROL SULFATE 90 UG/1
2 INHALANT RESPIRATORY (INHALATION)
COMMUNITY
Start: 2025-05-06

## 2025-07-30 RX ORDER — CEFUROXIME AXETIL 500 MG/1
1 TABLET ORAL 2 TIMES DAILY
COMMUNITY
Start: 2025-07-07

## 2025-08-06 ENCOUNTER — OFFICE VISIT (OUTPATIENT)
Age: OVER 89
End: 2025-08-06
Payer: COMMERCIAL

## 2025-08-13 ENCOUNTER — CONSULT (OUTPATIENT)
Dept: UROLOGY | Facility: MEDICAL CENTER | Age: OVER 89
End: 2025-08-13
Attending: INTERNAL MEDICINE
Payer: COMMERCIAL

## 2025-08-13 PROBLEM — N18.31 STAGE 3A CHRONIC KIDNEY DISEASE (HCC): Status: ACTIVE | Noted: 2025-08-13

## 2025-08-13 PROCEDURE — 88305 TISSUE EXAM BY PATHOLOGIST: CPT | Performed by: PATHOLOGY

## 2025-08-22 ENCOUNTER — OFFICE VISIT (OUTPATIENT)
Dept: UROLOGY | Facility: MEDICAL CENTER | Age: OVER 89
End: 2025-08-22
Payer: COMMERCIAL

## 2025-08-22 VITALS
BODY MASS INDEX: 23.95 KG/M2 | SYSTOLIC BLOOD PRESSURE: 110 MMHG | DIASTOLIC BLOOD PRESSURE: 68 MMHG | HEIGHT: 60 IN | WEIGHT: 122 LBS

## 2025-08-22 DIAGNOSIS — N95.2 ATROPHIC VAGINITIS: Primary | ICD-10-CM

## 2025-08-22 DIAGNOSIS — N93.9 VAGINAL BLEEDING: ICD-10-CM

## 2025-08-22 PROCEDURE — 99213 OFFICE O/P EST LOW 20 MIN: CPT | Performed by: STUDENT IN AN ORGANIZED HEALTH CARE EDUCATION/TRAINING PROGRAM
